# Patient Record
Sex: MALE | Race: BLACK OR AFRICAN AMERICAN | NOT HISPANIC OR LATINO | URBAN - METROPOLITAN AREA
[De-identification: names, ages, dates, MRNs, and addresses within clinical notes are randomized per-mention and may not be internally consistent; named-entity substitution may affect disease eponyms.]

---

## 2018-10-30 ENCOUNTER — HOSPITAL ENCOUNTER (INPATIENT)
Facility: HOSPITAL | Age: 68
LOS: 1 days | Discharge: LEFT BEFORE DISCHARGE | DRG: 282 | End: 2018-10-31
Attending: EMERGENCY MEDICINE | Admitting: HOSPITALIST

## 2018-10-30 DIAGNOSIS — I21.4 NSTEMI (NON-ST ELEVATED MYOCARDIAL INFARCTION): ICD-10-CM

## 2018-10-30 DIAGNOSIS — M79.89 LEG SWELLING: ICD-10-CM

## 2018-10-30 DIAGNOSIS — R07.9 CHEST PAIN: Primary | ICD-10-CM

## 2018-10-30 DIAGNOSIS — R45.5 HOSTILE BEHAVIOR: ICD-10-CM

## 2018-10-30 DIAGNOSIS — I21.4 NSTEMI (NON-ST ELEVATION MYOCARDIAL INFARCTION): ICD-10-CM

## 2018-10-30 DIAGNOSIS — I21.4 NON-ST ELEVATION MYOCARDIAL INFARCTION (NSTEMI): ICD-10-CM

## 2018-10-30 PROBLEM — F22 PARANOIA: Status: ACTIVE | Noted: 2018-10-30

## 2018-10-30 PROBLEM — E78.5 HLD (HYPERLIPIDEMIA): Status: ACTIVE | Noted: 2018-10-30

## 2018-10-30 PROBLEM — I10 HYPERTENSION: Status: ACTIVE | Noted: 2018-10-30

## 2018-10-30 PROBLEM — Z78.9 POOR HISTORIAN: Status: ACTIVE | Noted: 2018-10-30

## 2018-10-30 PROBLEM — R79.89 ELEVATED SERUM CREATININE: Status: ACTIVE | Noted: 2018-10-30

## 2018-10-30 PROBLEM — N17.9 AKI (ACUTE KIDNEY INJURY): Status: ACTIVE | Noted: 2018-10-30

## 2018-10-30 LAB
ABO + RH BLD: NORMAL
ALBUMIN SERPL BCP-MCNC: 4 G/DL
ALP SERPL-CCNC: 83 U/L
ALT SERPL W/O P-5'-P-CCNC: 21 U/L
ANION GAP SERPL CALC-SCNC: 11 MMOL/L
ANION GAP SERPL CALC-SCNC: 9 MMOL/L
APTT BLDCRRT: 25.3 SEC
APTT BLDCRRT: 41.3 SEC
AST SERPL-CCNC: 19 U/L
BASOPHILS # BLD AUTO: 0.06 K/UL
BASOPHILS NFR BLD: 0.6 %
BILIRUB SERPL-MCNC: 0.4 MG/DL
BLD GP AB SCN CELLS X3 SERPL QL: NORMAL
BNP SERPL-MCNC: 10 PG/ML
BUN SERPL-MCNC: 22 MG/DL
BUN SERPL-MCNC: 22 MG/DL
CALCIUM SERPL-MCNC: 10.1 MG/DL
CALCIUM SERPL-MCNC: 9.2 MG/DL
CHLORIDE SERPL-SCNC: 110 MMOL/L
CHLORIDE SERPL-SCNC: 111 MMOL/L
CHOLEST SERPL-MCNC: 194 MG/DL
CHOLEST/HDLC SERPL: 3.7 {RATIO}
CO2 SERPL-SCNC: 20 MMOL/L
CO2 SERPL-SCNC: 21 MMOL/L
CREAT SERPL-MCNC: 1.8 MG/DL
CREAT SERPL-MCNC: 2 MG/DL
DIFFERENTIAL METHOD: ABNORMAL
EOSINOPHIL # BLD AUTO: 0.1 K/UL
EOSINOPHIL NFR BLD: 0.5 %
ERYTHROCYTE [DISTWIDTH] IN BLOOD BY AUTOMATED COUNT: 14.7 %
EST. GFR  (AFRICAN AMERICAN): 38.5 ML/MIN/1.73 M^2
EST. GFR  (AFRICAN AMERICAN): 43.7 ML/MIN/1.73 M^2
EST. GFR  (NON AFRICAN AMERICAN): 33.3 ML/MIN/1.73 M^2
EST. GFR  (NON AFRICAN AMERICAN): 37.8 ML/MIN/1.73 M^2
ESTIMATED AVG GLUCOSE: 126 MG/DL
GLUCOSE SERPL-MCNC: 94 MG/DL
GLUCOSE SERPL-MCNC: 96 MG/DL
HBA1C MFR BLD HPLC: 6 %
HCT VFR BLD AUTO: 39.6 %
HDLC SERPL-MCNC: 53 MG/DL
HDLC SERPL: 27.3 %
HGB BLD-MCNC: 13 G/DL
IMM GRANULOCYTES # BLD AUTO: 0.05 K/UL
IMM GRANULOCYTES NFR BLD AUTO: 0.5 %
INR PPP: 1
LDLC SERPL CALC-MCNC: 131.8 MG/DL
LYMPHOCYTES # BLD AUTO: 2.4 K/UL
LYMPHOCYTES NFR BLD: 23 %
MAGNESIUM SERPL-MCNC: 2.2 MG/DL
MCH RBC QN AUTO: 29.5 PG
MCHC RBC AUTO-ENTMCNC: 32.8 G/DL
MCV RBC AUTO: 90 FL
MITRAL VALVE MOBILITY: NORMAL
MITRAL VALVE REGURGITATION: ABNORMAL
MONOCYTES # BLD AUTO: 0.8 K/UL
MONOCYTES NFR BLD: 7.2 %
NEUTROPHILS # BLD AUTO: 7.2 K/UL
NEUTROPHILS NFR BLD: 68.2 %
NONHDLC SERPL-MCNC: 141 MG/DL
NRBC BLD-RTO: 0 /100 WBC
PLATELET # BLD AUTO: 308 K/UL
PMV BLD AUTO: 10.4 FL
POTASSIUM SERPL-SCNC: 3.9 MMOL/L
POTASSIUM SERPL-SCNC: 4.3 MMOL/L
PROT SERPL-MCNC: 8.4 G/DL
PROTHROMBIN TIME: 10.7 SEC
RBC # BLD AUTO: 4.41 M/UL
RETIRED EF AND QEF - SEE NOTES: 40 (ref 55–65)
SODIUM SERPL-SCNC: 140 MMOL/L
SODIUM SERPL-SCNC: 142 MMOL/L
TRIGL SERPL-MCNC: 46 MG/DL
TROPONIN I SERPL DL<=0.01 NG/ML-MCNC: 0.05 NG/ML
TROPONIN I SERPL DL<=0.01 NG/ML-MCNC: 0.05 NG/ML
TROPONIN I SERPL DL<=0.01 NG/ML-MCNC: 0.06 NG/ML
TROPONIN I SERPL DL<=0.01 NG/ML-MCNC: 0.07 NG/ML
WBC # BLD AUTO: 10.56 K/UL

## 2018-10-30 PROCEDURE — 85025 COMPLETE CBC W/AUTO DIFF WBC: CPT

## 2018-10-30 PROCEDURE — 83036 HEMOGLOBIN GLYCOSYLATED A1C: CPT

## 2018-10-30 PROCEDURE — 25000003 PHARM REV CODE 250: Performed by: NURSE PRACTITIONER

## 2018-10-30 PROCEDURE — 25000003 PHARM REV CODE 250: Performed by: STUDENT IN AN ORGANIZED HEALTH CARE EDUCATION/TRAINING PROGRAM

## 2018-10-30 PROCEDURE — 85730 THROMBOPLASTIN TIME PARTIAL: CPT | Mod: 91

## 2018-10-30 PROCEDURE — 93306 TTE W/DOPPLER COMPLETE: CPT | Mod: 26,,, | Performed by: INTERNAL MEDICINE

## 2018-10-30 PROCEDURE — 96365 THER/PROPH/DIAG IV INF INIT: CPT

## 2018-10-30 PROCEDURE — 36415 COLL VENOUS BLD VENIPUNCTURE: CPT

## 2018-10-30 PROCEDURE — 63600175 PHARM REV CODE 636 W HCPCS: Performed by: HOSPITALIST

## 2018-10-30 PROCEDURE — 96375 TX/PRO/DX INJ NEW DRUG ADDON: CPT

## 2018-10-30 PROCEDURE — 99223 1ST HOSP IP/OBS HIGH 75: CPT | Mod: ,,, | Performed by: HOSPITALIST

## 2018-10-30 PROCEDURE — 93010 ELECTROCARDIOGRAM REPORT: CPT | Mod: 76,,, | Performed by: INTERNAL MEDICINE

## 2018-10-30 PROCEDURE — 96366 THER/PROPH/DIAG IV INF ADDON: CPT

## 2018-10-30 PROCEDURE — 84484 ASSAY OF TROPONIN QUANT: CPT | Mod: 91

## 2018-10-30 PROCEDURE — 93306 TTE W/DOPPLER COMPLETE: CPT

## 2018-10-30 PROCEDURE — 85610 PROTHROMBIN TIME: CPT

## 2018-10-30 PROCEDURE — 25000003 PHARM REV CODE 250: Performed by: EMERGENCY MEDICINE

## 2018-10-30 PROCEDURE — 63600175 PHARM REV CODE 636 W HCPCS: Performed by: STUDENT IN AN ORGANIZED HEALTH CARE EDUCATION/TRAINING PROGRAM

## 2018-10-30 PROCEDURE — 90791 PSYCH DIAGNOSTIC EVALUATION: CPT | Mod: ,,, | Performed by: NURSE PRACTITIONER

## 2018-10-30 PROCEDURE — 86901 BLOOD TYPING SEROLOGIC RH(D): CPT

## 2018-10-30 PROCEDURE — 93005 ELECTROCARDIOGRAM TRACING: CPT

## 2018-10-30 PROCEDURE — 99233 SBSQ HOSP IP/OBS HIGH 50: CPT | Mod: ,,, | Performed by: NURSE PRACTITIONER

## 2018-10-30 PROCEDURE — 99285 EMERGENCY DEPT VISIT HI MDM: CPT | Mod: 25

## 2018-10-30 PROCEDURE — 99285 EMERGENCY DEPT VISIT HI MDM: CPT | Mod: ,,, | Performed by: EMERGENCY MEDICINE

## 2018-10-30 PROCEDURE — 80048 BASIC METABOLIC PNL TOTAL CA: CPT

## 2018-10-30 PROCEDURE — 83880 ASSAY OF NATRIURETIC PEPTIDE: CPT

## 2018-10-30 PROCEDURE — 80053 COMPREHEN METABOLIC PANEL: CPT

## 2018-10-30 PROCEDURE — 25000003 PHARM REV CODE 250: Performed by: HOSPITALIST

## 2018-10-30 PROCEDURE — 85730 THROMBOPLASTIN TIME PARTIAL: CPT

## 2018-10-30 PROCEDURE — 80061 LIPID PANEL: CPT

## 2018-10-30 PROCEDURE — 11000001 HC ACUTE MED/SURG PRIVATE ROOM

## 2018-10-30 PROCEDURE — 83735 ASSAY OF MAGNESIUM: CPT

## 2018-10-30 PROCEDURE — 93010 ELECTROCARDIOGRAM REPORT: CPT | Mod: ,,, | Performed by: INTERNAL MEDICINE

## 2018-10-30 RX ORDER — HYDROMORPHONE HYDROCHLORIDE 1 MG/ML
1 INJECTION, SOLUTION INTRAMUSCULAR; INTRAVENOUS; SUBCUTANEOUS ONCE AS NEEDED
Status: COMPLETED | OUTPATIENT
Start: 2018-10-30 | End: 2018-10-30

## 2018-10-30 RX ORDER — OLANZAPINE 10 MG/2ML
5 INJECTION, POWDER, FOR SOLUTION INTRAMUSCULAR ONCE AS NEEDED
Status: DISPENSED | OUTPATIENT
Start: 2018-10-30 | End: 2018-10-30

## 2018-10-30 RX ORDER — LABETALOL HYDROCHLORIDE 5 MG/ML
20 INJECTION, SOLUTION INTRAVENOUS
Status: DISCONTINUED | OUTPATIENT
Start: 2018-10-30 | End: 2018-10-30

## 2018-10-30 RX ORDER — HEPARIN SODIUM,PORCINE/D5W 25000/250
12 INTRAVENOUS SOLUTION INTRAVENOUS CONTINUOUS
Status: DISCONTINUED | OUTPATIENT
Start: 2018-10-30 | End: 2018-10-30

## 2018-10-30 RX ORDER — CLONIDINE HYDROCHLORIDE 0.3 MG/1
0.3 TABLET ORAL
Status: COMPLETED | OUTPATIENT
Start: 2018-10-30 | End: 2018-10-30

## 2018-10-30 RX ORDER — ATORVASTATIN CALCIUM 20 MG/1
20 TABLET, FILM COATED ORAL DAILY
COMMUNITY

## 2018-10-30 RX ORDER — NAPROXEN SODIUM 220 MG/1
81 TABLET, FILM COATED ORAL DAILY
COMMUNITY

## 2018-10-30 RX ORDER — HEPARIN SODIUM,PORCINE/D5W 25000/250
30 INTRAVENOUS SOLUTION INTRAVENOUS
Status: DISCONTINUED | OUTPATIENT
Start: 2018-10-30 | End: 2018-10-30

## 2018-10-30 RX ORDER — DIAZEPAM 5 MG/1
5 TABLET ORAL EVERY 6 HOURS PRN
Status: DISCONTINUED | OUTPATIENT
Start: 2018-10-30 | End: 2018-10-31 | Stop reason: HOSPADM

## 2018-10-30 RX ORDER — CLOPIDOGREL BISULFATE 75 MG/1
75 TABLET ORAL DAILY
Status: DISCONTINUED | OUTPATIENT
Start: 2018-10-31 | End: 2018-10-31 | Stop reason: HOSPADM

## 2018-10-30 RX ORDER — ATORVASTATIN CALCIUM 20 MG/1
80 TABLET, FILM COATED ORAL DAILY
Status: DISCONTINUED | OUTPATIENT
Start: 2018-10-30 | End: 2018-10-31 | Stop reason: HOSPADM

## 2018-10-30 RX ORDER — NAPROXEN SODIUM 220 MG/1
81 TABLET, FILM COATED ORAL DAILY
Status: DISCONTINUED | OUTPATIENT
Start: 2018-10-31 | End: 2018-10-31 | Stop reason: HOSPADM

## 2018-10-30 RX ORDER — SYRING-NEEDL,DISP,INSUL,0.3 ML 29 G X1/2"
296 SYRINGE, EMPTY DISPOSABLE MISCELLANEOUS ONCE
COMMUNITY

## 2018-10-30 RX ORDER — TEMAZEPAM 22.5 MG/1
22.5 CAPSULE ORAL NIGHTLY PRN
COMMUNITY

## 2018-10-30 RX ORDER — NITROGLYCERIN 0.4 MG/1
0.4 TABLET SUBLINGUAL EVERY 5 MIN PRN
Status: DISCONTINUED | OUTPATIENT
Start: 2018-10-30 | End: 2018-10-30

## 2018-10-30 RX ORDER — CARVEDILOL 12.5 MG/1
12.5 TABLET ORAL 2 TIMES DAILY
Status: DISCONTINUED | OUTPATIENT
Start: 2018-10-30 | End: 2018-10-31 | Stop reason: HOSPADM

## 2018-10-30 RX ORDER — HALOPERIDOL 2 MG/1
2 TABLET ORAL EVERY 8 HOURS PRN
Status: DISCONTINUED | OUTPATIENT
Start: 2018-10-30 | End: 2018-10-30

## 2018-10-30 RX ORDER — OLANZAPINE 10 MG/1
10 TABLET ORAL DAILY
Status: DISCONTINUED | OUTPATIENT
Start: 2018-10-31 | End: 2018-10-31 | Stop reason: HOSPADM

## 2018-10-30 RX ORDER — MULTIVITAMIN
1 TABLET ORAL DAILY
COMMUNITY

## 2018-10-30 RX ORDER — CARVEDILOL 3.12 MG/1
3.12 TABLET ORAL 2 TIMES DAILY
Status: DISCONTINUED | OUTPATIENT
Start: 2018-10-30 | End: 2018-10-30

## 2018-10-30 RX ORDER — DIAZEPAM 5 MG/1
5 TABLET ORAL EVERY 6 HOURS PRN
Status: DISCONTINUED | OUTPATIENT
Start: 2018-10-30 | End: 2018-10-30

## 2018-10-30 RX ORDER — CLONIDINE 0.3 MG/24H
1 PATCH, EXTENDED RELEASE TRANSDERMAL
COMMUNITY

## 2018-10-30 RX ORDER — HEPARIN SODIUM,PORCINE/D5W 25000/250
18 INTRAVENOUS SOLUTION INTRAVENOUS CONTINUOUS
Status: DISCONTINUED | OUTPATIENT
Start: 2018-10-30 | End: 2018-10-30

## 2018-10-30 RX ORDER — HALOPERIDOL 5 MG/ML
5 INJECTION INTRAMUSCULAR EVERY 6 HOURS PRN
Status: DISCONTINUED | OUTPATIENT
Start: 2018-10-30 | End: 2018-10-30

## 2018-10-30 RX ORDER — METOPROLOL TARTRATE 1 MG/ML
10 INJECTION, SOLUTION INTRAVENOUS
Status: COMPLETED | OUTPATIENT
Start: 2018-10-30 | End: 2018-10-30

## 2018-10-30 RX ORDER — SODIUM CHLORIDE 9 MG/ML
INJECTION, SOLUTION INTRAVENOUS CONTINUOUS
Status: DISCONTINUED | OUTPATIENT
Start: 2018-10-30 | End: 2018-10-31 | Stop reason: HOSPADM

## 2018-10-30 RX ORDER — ASPIRIN 325 MG
325 TABLET ORAL
Status: COMPLETED | OUTPATIENT
Start: 2018-10-30 | End: 2018-10-30

## 2018-10-30 RX ORDER — METOPROLOL TARTRATE 50 MG/1
50 TABLET ORAL 2 TIMES DAILY
Status: DISCONTINUED | OUTPATIENT
Start: 2018-10-30 | End: 2018-10-30

## 2018-10-30 RX ORDER — ENOXAPARIN SODIUM 100 MG/ML
1 INJECTION SUBCUTANEOUS
Status: DISCONTINUED | OUTPATIENT
Start: 2018-10-30 | End: 2018-10-31 | Stop reason: HOSPADM

## 2018-10-30 RX ORDER — METOPROLOL TARTRATE 25 MG/1
25 TABLET, FILM COATED ORAL 2 TIMES DAILY
Status: DISCONTINUED | OUTPATIENT
Start: 2018-10-30 | End: 2018-10-30

## 2018-10-30 RX ADMIN — TICAGRELOR 180 MG: 90 TABLET ORAL at 07:10

## 2018-10-30 RX ADMIN — METOPROLOL TARTRATE 10 MG: 5 INJECTION, SOLUTION INTRAVENOUS at 05:10

## 2018-10-30 RX ADMIN — METOPROLOL TARTRATE 50 MG: 25 TABLET ORAL at 09:10

## 2018-10-30 RX ADMIN — ASPIRIN 325 MG ORAL TABLET 325 MG: 325 PILL ORAL at 03:10

## 2018-10-30 RX ADMIN — HEPARIN SODIUM AND DEXTROSE 18 UNITS/KG/HR: 10000; 5 INJECTION INTRAVENOUS at 05:10

## 2018-10-30 RX ADMIN — HEPARIN SODIUM AND DEXTROSE 12 UNITS/KG/HR: 10000; 5 INJECTION INTRAVENOUS at 07:10

## 2018-10-30 RX ADMIN — CLONIDINE HYDROCHLORIDE 0.3 MG: 0.3 TABLET ORAL at 04:10

## 2018-10-30 RX ADMIN — CARVEDILOL 12.5 MG: 12.5 TABLET, FILM COATED ORAL at 06:10

## 2018-10-30 RX ADMIN — ATORVASTATIN CALCIUM 80 MG: 20 TABLET, FILM COATED ORAL at 09:10

## 2018-10-30 RX ADMIN — HYDROMORPHONE HYDROCHLORIDE 1 MG: 1 INJECTION, SOLUTION INTRAMUSCULAR; INTRAVENOUS; SUBCUTANEOUS at 04:10

## 2018-10-30 RX ADMIN — DIAZEPAM 5 MG: 5 TABLET ORAL at 08:10

## 2018-10-30 RX ADMIN — SODIUM CHLORIDE 1000 ML: 0.9 INJECTION, SOLUTION INTRAVENOUS at 05:10

## 2018-10-30 NOTE — ASSESSMENT & PLAN NOTE
-Angina related to cocaine abuse although patient has history of CAD in the past  -Bedside echo did not show wall motion abnormalities  -This less likely to correspond to acute DVT or PE  -Would treat angina with BB as such as metoprolol and his BP as well.   -Would continue to trend troponins with EKG  -Obtain 2D echo  -Would hold of on angiogram for now until re-evaluation after proper medical therapy for angina and blood pressure  -Cardiology will follow along

## 2018-10-30 NOTE — SUBJECTIVE & OBJECTIVE
Past Medical History:   Diagnosis Date    DVT (deep venous thrombosis)     High cholesterol     Hypertension     MI, old     PE (pulmonary thromboembolism)     Stroke        History reviewed. No pertinent surgical history.    Review of patient's allergies indicates:   Allergen Reactions    Nitroglycerin Hives       No current facility-administered medications on file prior to encounter.      Current Outpatient Medications on File Prior to Encounter   Medication Sig    aspirin 81 MG Chew Take 81 mg by mouth once daily.    atorvastatin (LIPITOR) 20 MG tablet Take 20 mg by mouth once daily.    cloNIDine 0.3 mg/24 hr td ptwk (CATAPRES) 0.3 mg/24 hr Place 1 patch onto the skin every 7 days.    isometheptene-apap-dichloralphenazone 189-40-266bw (MIDRIN) -325 mg per capsule Take 1 capsule by mouth 4 (four) times daily as needed.    magnesium citrate (CITRATE OF MAGNESIA) solution Take 296 mLs by mouth once.    multivitamin (THERAGRAN) per tablet Take 1 tablet by mouth once daily.    temazepam (RESTORIL) 22.5 MG capsule Take 22.5 mg by mouth nightly as needed for Insomnia.     Family History     None        Tobacco Use    Smoking status: Light Tobacco Smoker     Types: Cigarettes    Smokeless tobacco: Never Used   Substance and Sexual Activity    Alcohol use: No     Frequency: Never    Drug use: No    Sexual activity: Not on file     Review of Systems   Constitutional: Positive for fatigue. Negative for activity change.   HENT: Negative for congestion and nosebleeds.    Eyes: Negative for photophobia and visual disturbance.   Respiratory: Negative for cough and shortness of breath.    Cardiovascular: Positive for chest pain and leg swelling. Negative for palpitations.   Gastrointestinal: Negative for abdominal pain, nausea and vomiting.   Endocrine: Negative for polydipsia and polyuria.   Genitourinary: Negative for dysuria and hematuria.   Musculoskeletal: Positive for arthralgias and gait problem.    Skin: Negative for color change and rash.   Neurological: Negative for dizziness, syncope and light-headedness.   Psychiatric/Behavioral: Positive for sleep disturbance.     Objective:     Vital Signs (Most Recent):  Temp: 97.4 °F (36.3 °C) (10/30/18 0349)  Pulse: 70 (10/30/18 0600)  Resp: 18 (10/30/18 0349)  BP: 127/61 (10/30/18 0559)  SpO2: 98 % (10/30/18 0600) Vital Signs (24h Range):  Temp:  [97.4 °F (36.3 °C)-98.3 °F (36.8 °C)] 97.4 °F (36.3 °C)  Pulse:  [70-99] 70  Resp:  [18] 18  SpO2:  [97 %-98 %] 98 %  BP: (127-174)/(61-97) 127/61     Weight: 106.6 kg (235 lb)  Body mass index is 32.78 kg/m².    Physical Exam   Constitutional: He is oriented to person, place, and time. He appears well-developed and well-nourished. No distress.   Frequently dozes off, sometimes mid-sentence while speaking.   HENT:   Head: Normocephalic and atraumatic.   Mouth/Throat: Oropharynx is clear and moist.   Eyes: Conjunctivae are normal. Pupils are equal, round, and reactive to light. No scleral icterus.   Neck: Normal range of motion. Neck supple. No JVD present.   Cardiovascular: Normal rate, regular rhythm, normal heart sounds and intact distal pulses. Exam reveals no gallop and no friction rub.   No murmur heard.  Pulmonary/Chest: Effort normal and breath sounds normal. He exhibits no tenderness.   Abdominal: Soft. Bowel sounds are normal. He exhibits no distension. There is no tenderness. There is no guarding.   Musculoskeletal: Normal range of motion. He exhibits edema (RLE mildly enlarged compared to LLE.  Sachin's positive). He exhibits no deformity.   Lymphadenopathy:     He has no cervical adenopathy.   Neurological: He is oriented to person, place, and time. No cranial nerve deficit.   Nods off frequently   Skin: Skin is warm and dry. No erythema. No pallor.   Nursing note and vitals reviewed.        CRANIAL NERVES     CN III, IV, VI   Pupils are equal, round, and reactive to light.       Significant Labs:   CBC:    Recent Labs   Lab 10/30/18  0310   WBC 10.56   HGB 13.0*   HCT 39.6*        CMP:   Recent Labs   Lab 10/30/18  0310      K 4.3   *   CO2 20*   GLU 96   BUN 22   CREATININE 2.0*   CALCIUM 10.1   PROT 8.4   ALBUMIN 4.0   BILITOT 0.4   ALKPHOS 83   AST 19   ALT 21   ANIONGAP 11   EGFRNONAA 33.3*     Troponin:   Recent Labs   Lab 10/30/18  0310   TROPONINI 0.047*  0.058*       Significant Imaging: CXR: I have reviewed all pertinent results/findings within the past 24 hours and my personal findings are:  No acute cardiopulmonary pathology

## 2018-10-30 NOTE — NURSING
Informed by transporter that pt wanted to know why he was going down to nuclear medicine. Arrived at bedside to explain to pt why he needed to go down, interrupted by pt who stated he was not going down and angrily yelling to get out of his room. Notified Christine Ayon NP. NP to come up to speak to pt.

## 2018-10-30 NOTE — NURSING
" Patient on phone for 20  Minutes asking for who was incharge stating that "this floor is incompetent and the Charge nurse is incompetent. Patient refuses to answer questions regarding admissions.Patient refuses vital signs as well.  Patient informed this writer that all I needed to do is get him some water and leave. Got patient his water and informed the charge nurse.  "

## 2018-10-30 NOTE — ED TRIAGE NOTES
"Pt reports left sided chest pain. Pt states it feels like an "ice pick is picking me" in the chest. Pt reports SOB with pain. Pt states he has hx of MI and it feels the same as in the past.   "

## 2018-10-30 NOTE — NURSING
Cardiology at bedside, Dr. Treadwell aware of intermittent CP and EKG taken. Provider currently on phone with attending service Med Team QI

## 2018-10-30 NOTE — ASSESSMENT & PLAN NOTE
Assessment:   Mr. Ramirez is a 69 y/o male who presented to [facility] [place] for with chest pain.Psychiatry was consulted for hostile behavior and patient request to be put on his psychiatric home medications. During interview patient is upset but cooperative. He reports he want to remain in the hospital and receive treatment, however, he appears to have an unrealistic expectation of what a hospital stay involves.     Recommendations:  1. Dispo/Legal: None at this time as patient is not a danger to himself or others.   2. Scheduled Medications: Restoril 15 mg by mouth at bedtime. May increase to 30 mg tomorrow is patient tolerates first dose.  3. PRN Medications: Zyprexa 5 mg PO or IM prn non-redirectable agitation if needed to help the pt more effectively interact with environment.   4. Will continue to follow.

## 2018-10-30 NOTE — H&P
"[facility] Hospital Medicine  History & Physical    Patient Name: Denis Ramirez  MRN: [id number]  Admission Date: 10/30/2018  Attending Physician: Geronimo Puente MD   Primary Care Provider: No primary care provider on file.    Hospital Medicine Team: Networked reference to record PCT  Vahid Tanner MD     Patient information was obtained from patient and ER records.     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  After the show he got up to come here and noted worsening with activity.  It is mid-chest "like an ice pick" but not associated with diaphoresis, nausea, dyspnea, or pleurisy.  He notes that the sensation is identical to the last time he had a blood clot go to his lungs.  He admits that he hasn't been taking care of himself like he should be and is only partially compliant with his medications as he is often on the road.  The pain has remained persistent and refuses nitroglycerin due to prior exposures causing hives.  The pain improved following hydromorphone administration but has not resolved, and repeatedly dozes off during interview.  He also endorses increased RLE edema and pain, which is the leg in which he previously had had a DVT.  He also reports that he has bad arthritis in his knee and lately has fallen multiple times.    Past Medical History:   Diagnosis Date    DVT (deep venous thrombosis)     High cholesterol     Hypertension     MI, old     PE (pulmonary thromboembolism)     Stroke        History reviewed. No pertinent surgical history.    Review of patient's allergies indicates:   Allergen Reactions    Nitroglycerin Hives       No current facility-administered medications on " file prior to encounter.      Current Outpatient Medications on File Prior to Encounter   Medication Sig    aspirin 81 MG Chew Take 81 mg by mouth once daily.    atorvastatin (LIPITOR) 20 MG tablet Take 20 mg by mouth once daily.    cloNIDine 0.3 mg/24 hr td ptwk (CATAPRES) 0.3 mg/24 hr Place 1 patch onto the skin every 7 days.    isometheptene-apap-dichloralphenazone 594-12-544qd (MIDRIN) -325 mg per capsule Take 1 capsule by mouth 4 (four) times daily as needed.    magnesium citrate (CITRATE OF MAGNESIA) solution Take 296 mLs by mouth once.    multivitamin (THERAGRAN) per tablet Take 1 tablet by mouth once daily.    temazepam (RESTORIL) 22.5 MG capsule Take 22.5 mg by mouth nightly as needed for Insomnia.     Family History     None        Tobacco Use    Smoking status: Light Tobacco Smoker     Types: Cigarettes    Smokeless tobacco: Never Used   Substance and Sexual Activity    Alcohol use: No     Frequency: Never    Drug use: No    Sexual activity: Not on file     Review of Systems   Constitutional: Positive for fatigue. Negative for activity change.   HENT: Negative for congestion and nosebleeds.    Eyes: Negative for photophobia and visual disturbance.   Respiratory: Negative for cough and shortness of breath.    Cardiovascular: Positive for chest pain and leg swelling. Negative for palpitations.   Gastrointestinal: Negative for abdominal pain, nausea and vomiting.   Endocrine: Negative for polydipsia and polyuria.   Genitourinary: Negative for dysuria and hematuria.   Musculoskeletal: Positive for arthralgias and gait problem.   Skin: Negative for color change and rash.   Neurological: Negative for dizziness, syncope and light-headedness.   Psychiatric/Behavioral: Positive for sleep disturbance.     Objective:     Vital Signs (Most Recent):  Temp: 97.4 °F (36.3 °C) (10/30/18 0349)  Pulse: 70 (10/30/18 0600)  Resp: 18 (10/30/18 0349)  BP: 127/61 (10/30/18 0559)  SpO2: 98 % (10/30/18 0600)  Vital Signs (24h Range):  Temp:  [97.4 °F (36.3 °C)-98.3 °F (36.8 °C)] 97.4 °F (36.3 °C)  Pulse:  [70-99] 70  Resp:  [18] 18  SpO2:  [97 %-98 %] 98 %  BP: (127-174)/(61-97) 127/61     Weight: 106.6 kg (235 lb)  Body mass index is 32.78 kg/m².    Physical Exam   Constitutional: He is oriented to person, place, and time. He appears well-developed and well-nourished. No distress.   Frequently dozes off, sometimes mid-sentence while speaking.   HENT:   Head: Normocephalic and atraumatic.   Mouth/Throat: Oropharynx is clear and moist.   Eyes: Conjunctivae are normal. Pupils are equal, round, and reactive to light. No scleral icterus.   Neck: Normal range of motion. Neck supple. No JVD present.   Cardiovascular: Normal rate, regular rhythm, normal heart sounds and intact distal pulses. Exam reveals no gallop and no friction rub.   No murmur heard.  Pulmonary/Chest: Effort normal and breath sounds normal. He exhibits no tenderness.   Abdominal: Soft. Bowel sounds are normal. He exhibits no distension. There is no tenderness. There is no guarding.   Musculoskeletal: Normal range of motion. He exhibits edema (RLE mildly enlarged compared to LLE.  Sachin's positive). He exhibits no deformity.   Lymphadenopathy:     He has no cervical adenopathy.   Neurological: He is oriented to person, place, and time. No cranial nerve deficit.   Nods off frequently   Skin: Skin is warm and dry. No erythema. No pallor.   Nursing note and vitals reviewed.        CRANIAL NERVES     CN III, IV, VI   Pupils are equal, round, and reactive to light.       Significant Labs:   CBC:   Recent Labs   Lab 10/30/18  0310   WBC 10.56   HGB 13.0*   HCT 39.6*        CMP:   Recent Labs   Lab 10/30/18  0310      K 4.3   *   CO2 20*   GLU 96   BUN 22   CREATININE 2.0*   CALCIUM 10.1   PROT 8.4   ALBUMIN 4.0   BILITOT 0.4   ALKPHOS 83   AST 19   ALT 21   ANIONGAP 11   EGFRNONAA 33.3*     Troponin:   Recent Labs   Lab 10/30/18  0310   TROPONINI  0.047*  0.058*       Significant Imaging: CXR: I have reviewed all pertinent results/findings within the past 24 hours and my personal findings are:  No acute cardiopulmonary pathology    Assessment/Plan:     * NSTEMI (non-ST elevated myocardial infarction)    Mildly elevated troponin with ongoing chest pain.  Seems exertional but unable to use nitroglycerin to probe for relief.  Discussed with Cardiology fellow; if 2nd troponin (pending) increases with ongoing chest pain contact Interventional Cardiology to activate cath lab.  US LE pending to evaluate for DVT; will get V/Q scan to evaluate for PE.  NSTEMI pathway initiated with low-intensity heparin infusion protocol in event LHC warranted.         Elevated serum creatinine    Unknown baseline, but with low BUN likely degree of chronic renal insufficiency.  He reports being told that he has kidney problems.  No CTA to evaluate for PE.  Caution with contrast if patient requires LHC.         VTE Risk Mitigation (From admission, onward)        Ordered     heparin 25,000 units in dextrose 5% (100 units/ml) IV bolus from bag  Once      10/30/18 0633     heparin 25,000 units in dextrose 5% 250 mL (100 units/mL) infusion LOW INTENSITY nomogram - [facility]  Continuous      10/30/18 0633     heparin 25,000 units in dextrose 5% (100 units/ml) IV bolus from bag - ADDITIONAL PRN BOLUS - 60 units/kg (max bolus 4000 units)  As needed (PRN)      10/30/18 0633     heparin 25,000 units in dextrose 5% 250 mL (100 units/mL) infusion LOW INTENSITY nomogram - [facility]  As needed (PRN)      10/30/18 0633     Reason for No Pharmacological VTE Prophylaxis  Once      10/30/18 0633     IP VTE HIGH RISK PATIENT  Once      10/30/18 0633     Reason for no Mechanical VTE Prophylaxis  Once      10/30/18 0459             Vahid Tanner MD  Department of Hospital Medicine   [facility]

## 2018-10-30 NOTE — CONSULTS
"[facility]  Psychiatry  Consult Note    Patient Name: Denis Ramirez  MRN: [id number]   Code Status: Full Code  Admission Date: 10/30/2018  Hospital Length of Stay: 0 days  Attending Physician: Geronimo Puente MD  Primary Care Provider: Primary Doctor No    Current Legal Status: N/A    Patient information was obtained from patient and ER records.   Inpatient consult to Psychiatry  Consult performed by: Modesta Arredondo DNP  Consult ordered by: Christine Ayon NP  Reason for consult: hostile behavior, requesting pysch consult review medications.        Subjective:     Principal Problem:NSTEMI (non-ST elevated myocardial infarction)    Chief Complaint:  hostile behavior, requesting pysch consult review medications     HPI: Per ED MD:  "68 y.o. M p/w chest pain. Hx of MI in the past x2, also DVT and PE diagnosed a few months ago. 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 +shortness of breath, no nausea or diaphoresis. He says it feels the exact same as when he had an MI a few years ago. Endorses "un poquito" cocaine use, however denies using it tonight. 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  After the show he got up to come here and noted worsening with activity.  It is mid-chest "like an ice pick" but not associated with diaphoresis, nausea, dyspnea, or pleurisy.  He notes that the sensation is identical to the last " "time he had a blood clot go to his lungs.  He admits that he hasn't been taking care of himself like he should be and is only partially compliant with his medications as he is often on the road.  The pain has remained persistent and refuses nitroglycerin due to prior exposures causing hives.  The pain improved following hydromorphone administration but has not resolved, and repeatedly dozes off during interview.  He also endorses increased RLE edema and pain, which is the leg in which he previously had had a DVT.  He also reports that he has bad arthritis in his knee and lately has fallen multiple times."    On my interview:  Patient is tangential and perseverating on the events of early today. He reports he came in for his heart; "they ran 12 testes last night which I was NPO for." He reports already being "npo" for several hours prior to this hospitalization as a result of his job as a . He reports after "16 hours" of being NPO he had enough and needed to eat. He became upset when the doctor confronted him about refusing tests without asking him why he was refusing them. He feel he was "hurt my injustice" and staff here "dont respect the sovereignty and prestige of this establishment." he admits to "calling them name because they were being unneccessary snitches." He went on to expressed issues with security, nursing staff, and the "practitioners" on his care team. He reports he want to remain in the hospital and receive treatment, however, he appears to have an unrealistic expectation of what a hospital stay involves.      Regarding psychiatric history patient was evasive in his details. He reports he takes Restoril 30 mg and Ativan 5 mg at bedtime. He reports he is unable to take "any psychotropics or antidepressants because they enhance my negativity." Previous medications include Mellaril, Wellbutrin. Zoloft, Prolixin, Tegretol, Risperdal, Haldol, and Thorazine. He reports his psychiatric diagnoses " "are sleep disturbances and dementia. Denies previous hospitalization, past or present SI/HI/AV/VH, delusions, or paranoia. He denies history depression. Regarding manic symptoms he reports history of going 2 weeks without sleep (denies drug use during that time); denies FOI, racing thoughts, impulsive/risk taking behaviors. He uses cocaine during holidays ("christmas and new years") only.    Interview terminated prematurely because patient taken to nuclear med test.     Hospital Course: No notes on file         Patient History           Medical as of 10/30/2018     Past Medical History     Diagnosis Date Comments Source    DVT (deep venous thrombosis) -- -- Provider    High cholesterol -- -- Provider    Hypertension -- -- Provider    MI, old -- -- Provider    PE (pulmonary thromboembolism) -- -- Provider    Stroke -- -- Provider          Pertinent Negatives     Diagnosis Date Noted Comments Source    Diabetes mellitus 10/30/2018 -- Provider                  Surgical as of 10/30/2018    Past Surgical History: Patient provided no pertinent surgical history.           Family as of 10/30/2018    None           Tobacco Use as of 10/30/2018     Smoking Status Smoking Start Date Smoking Quit Date Packs/Day Years Used    Light Tobacco Smoker -- -- -- --    Types Comments Smokeless Tobacco Status Smokeless Tobacco Quit Date Source     Cigarettes -- Never Used -- Provider            Alcohol Use as of 10/30/2018     Alcohol Use Drinks/Week Alcohol/Week Comments Source    No -- -- -- Provider    Frequency Standard Drinks Binge Drinking Source      Never -- -- Provider             Drug Use as of 10/30/2018     Drug Use Types Frequency Comments Source    No -- -- -- Provider            Sexual Activity as of 10/30/2018     Sexually Active Birth Control Partners Comments Source    -- -- -- -- Provider            Activities of Daily Living as of 10/30/2018    None           Social Documentation as of 10/30/2018    None         "   Occupational as of 10/30/2018    None           Socioeconomic as of 10/30/2018     Marital Status Spouse Name Number of Children Years Education Education Level Preferred Language Ethnicity Race Source    Unknown -- -- -- -- English /Black Black or  --    Financial Resource Strain Food Insecurity: Worry Food Insecurity: Inability Transportation Needs: Medical Transportation Needs: Non-medical       -- -- -- -- --             Pertinent History     Question Response Comments    Lives with -- --    Place in Birth Order -- --    Lives in -- --    Number of Siblings -- --    Raised by -- --    Legal Involvement -- --    Childhood Trauma -- --    Criminal History of -- --    Financial Status -- --    Highest Level of Education -- --    Does patient have access to a firearm? -- --     Service -- --    Primary Leisure Activity -- --    Spirituality -- --        Past Medical History:   Diagnosis Date    DVT (deep venous thrombosis)     High cholesterol     Hypertension     MI, old     PE (pulmonary thromboembolism)     Stroke      History reviewed. No pertinent surgical history.  Family History     None        Tobacco Use    Smoking status: Light Tobacco Smoker     Types: Cigarettes    Smokeless tobacco: Never Used   Substance and Sexual Activity    Alcohol use: No     Frequency: Never    Drug use: No    Sexual activity: Not on file     Review of patient's allergies indicates:   Allergen Reactions    Nitroglycerin Hives       No current facility-administered medications on file prior to encounter.      Current Outpatient Medications on File Prior to Encounter   Medication Sig    aspirin 81 MG Chew Take 81 mg by mouth once daily.    atorvastatin (LIPITOR) 20 MG tablet Take 20 mg by mouth once daily.    cloNIDine 0.3 mg/24 hr td ptwk (CATAPRES) 0.3 mg/24 hr Place 1 patch onto the skin every 7 days.    isometheptene-apap-dichloralphenazone [id number] (MIDRIN)  "-325 mg per capsule Take 1 capsule by mouth 4 (four) times daily as needed.    magnesium citrate (CITRATE OF MAGNESIA) solution Take 296 mLs by mouth once.    multivitamin (THERAGRAN) per tablet Take 1 tablet by mouth once daily.    temazepam (RESTORIL) 22.5 MG capsule Take 22.5 mg by mouth nightly as needed for Insomnia.     Psychotherapeutics (From admission, onward)    Start     Stop Route Frequency Ordered    10/30/18 1745  diazePAM tablet 5 mg      -- Oral Every 6 hours PRN 10/30/18 1645    10/30/18 1443  haloperidol tablet 2 mg      -- Oral Every 8 hours PRN 10/30/18 1443    10/30/18 1443  haloperidol lactate injection 5 mg      -- IV Every 6 hours PRN 10/30/18 1443        Review of Systems   Constitutional: Negative.    HENT: Negative.    Eyes: Negative.    Endocrine: Negative.    Allergic/Immunologic: Negative.    Psychiatric/Behavioral: Positive for agitation, behavioral problems and sleep disturbance. Negative for decreased concentration, dysphoric mood, hallucinations, self-injury and suicidal ideas.     Strengths and Liabilities: Strength: Patient is expressive/articulate., Strength: Patient is intelligent., Liability: Patient lacks social skills., Liability: Patient has no suport network., Liability: Patient lacks coping skills.    Objective:     Vital Signs (Most Recent):  Temp: 97.8 °F (36.6 °C) (10/30/18 0822)  Pulse: 70 (10/30/18 0600)  Resp: 19 (10/30/18 0822)  BP: 127/61 (10/30/18 0559)  SpO2: 98 % (10/30/18 0600) Vital Signs (24h Range):  Temp:  [97.4 °F (36.3 °C)-98.3 °F (36.8 °C)] 97.8 °F (36.6 °C)  Pulse:  [70-99] 70  Resp:  [18-19] 19  SpO2:  [97 %-98 %] 98 %  BP: (127-174)/(61-97) 127/61     Height: 5' 11" (180.3 cm)  Weight: 106.6 kg (235 lb)  Body mass index is 32.78 kg/m².    No intake or output data in the 24 hours ending 10/30/18 1651    Physical Exam   Constitutional: He is oriented to person, place, and time.   Neurological: He is oriented to person, place, and time. Gait " normal.   Psychiatric: His behavior is normal. Cognition and memory are normal.   Psychiatric Review Of Systems - Is patient experiencing or having changes in:  sleep: yes, without restoril  appetite: no  weight: no  energy/anergy: no  interest/pleasure/anhedonia: no  somatic symptoms: no  libido: no  anxiety/panic: yes, without ativan  guilty/hopelessness: no  concentration: no  S.I.B.s/risky behavior: no  Irritability: yes, without ativan  Racing thoughts: no  Impulsive behaviors: no  Paranoia:no  AVH:no     NEUROLOGICAL EXAMINATION:     MENTAL STATUS   Oriented to person, place, and time.   Level of consciousness: alert       Appearance: unremarkable, age appropriate, well nourished, lying in bed, dressed in paper scrubs  Behavior/Copperation: cooperative  Speech: normal tone, normal rate, normal pitch, normal volume, spontaneous  Mood:  upset  Affect: mood-congruent  Thought Process: tangential, perseverative  Thought Content: normal, no suicidality, no homicidality, delusions, or paranoia  Orientation:  grossly intact  Memory: Grossly intact  Attention/Concentration:  Normal  Cognition: grossly intact  Insight: fair  Judgement: fair  Reliability: questionable       GAIT AND COORDINATION     Gait  Gait: normal    Significant Labs: Urine Drug screen pending collection    Significant Imaging: Chest x-ray unremarkable    Assessment/Plan:     Hostile behavior    Assessment:   Mr. Ramirez is a 67 y/o male who presented to [facility] for with chest pain.Psychiatry was consulted for hostile behavior and patient request to be put on his psychiatric home medications. During interview patient is upset but cooperative. He reports he want to remain in the hospital and receive treatment, however, he appears to have an unrealistic expectation of what a hospital stay involves.     Recommendations:  1. Dispo/Legal: None at this time as patient is not a danger to himself or others.   2. Scheduled Medications: Restoril 15 mg by mouth at  bedtime. May increase to 30 mg tomorrow is patient tolerates first dose.  3. PRN Medications: Zyprexa 5 mg PO or IM prn non-redirectable agitation if needed to help the pt more effectively interact with environment.   4. Will continue to follow.               Total Time:  45 minutes     Modesta Arredondo DNP   Psychiatry  [facility]

## 2018-10-30 NOTE — ED NOTES
Pt resting comfortably and independently repositioned in stretcher with bed locked in lowest position for safety. NAD noted at this time. Respirations even and unlabored and visible chest rise noted.  Patient offered bathroom assistance and denies need at this time. Pt instructed to call if assistance is needed. Pt on continuous cardiac, BP, and O2 monitoring. Call light within reach. No needs at this time. Will continue to monitor.

## 2018-10-30 NOTE — NURSING
Patient on tele box with [facility]. Attempted twice to get labs for troponin and ptt for heparin gtt due at 1130. Patient refused twice. RN called Christine Ayon who said to leave gtt running. Floor RN aware.

## 2018-10-30 NOTE — ED PROVIDER NOTES
"Encounter Date: 10/30/2018    SCRIBE #1 NOTE: I, Gaby Paige, am scribing for, and in the presence of,  Dr. Lopez . I have scribed the following portions of the note - the Resident attestation.       History     Chief Complaint   Patient presents with    Chest Pain     pt reports waking up with CP x 4 hours     HPI   68 y.o. M p/w chest pain. Hx of MI in the past x2, also DVT and PE diagnosed a few months ago.   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 +shortness of breath, no nausea or diaphoresis.  He says it feels the exact same as when he had an MI a few years ago.   Endorses "un poquito" cocaine use, however denies using it tonight.   He has been having RLE swelling for months, which is how he was diagnosed with DVT and PE, in [place].      Review of patient's allergies indicates:   Allergen Reactions    Nitroglycerin Hives     Past Medical History:   Diagnosis Date    DVT (deep venous thrombosis)     High cholesterol     Hypertension     MI, old     Stroke      History reviewed. No pertinent surgical history.  History reviewed. No pertinent family history.  Social History     Tobacco Use    Smoking status: Light Tobacco Smoker     Types: Cigarettes    Smokeless tobacco: Never Used   Substance Use Topics    Alcohol use: No     Frequency: Never    Drug use: No     Review of Systems   Constitutional: Negative for chills, diaphoresis and fever.   HENT: Negative for rhinorrhea and sore throat.    Eyes: Negative for pain and visual disturbance.   Respiratory: Positive for shortness of breath. Negative for cough and chest " tightness.    Cardiovascular: Positive for chest pain and leg swelling. Negative for palpitations.   Gastrointestinal: Negative for abdominal pain, blood in stool, constipation, nausea and vomiting.   Genitourinary: Negative for dysuria, frequency and hematuria.   Musculoskeletal: Negative for back pain and neck stiffness.   Skin: Negative for rash and wound.   Neurological: Negative for seizures, syncope, weakness, light-headedness and headaches.   All other systems reviewed and are negative.      Physical Exam     Initial Vitals [10/30/18 0247]   BP Pulse Resp Temp SpO2   (!) 159/86 99 18 98.3 °F (36.8 °C) 97 %      MAP       --         Physical Exam    Nursing note and vitals reviewed.  Constitutional: He appears well-developed. He is not diaphoretic. No distress.   HENT:   Head: Normocephalic and atraumatic.   Nose: Nose normal.   Mouth/Throat: Oropharynx is clear and moist.   Eyes: Conjunctivae and EOM are normal. Pupils are equal, round, and reactive to light.   Neck: Normal range of motion. Neck supple.   Cardiovascular: Normal heart sounds and intact distal pulses.   No murmur heard.  Pulses:       Dorsalis pedis pulses are 2+ on the right side, and 2+ on the left side.   Pulmonary/Chest: Breath sounds normal. No respiratory distress. He exhibits no tenderness.   Abdominal: Soft. Normal appearance and bowel sounds are normal. He exhibits no distension. There is no tenderness.   Neurological: He is alert and oriented to person, place, and time. He has normal strength. He exhibits normal muscle tone.   Skin: Skin is warm and dry. No pallor.         ED Course   Procedures  Labs Reviewed   CBC W/ AUTO DIFFERENTIAL - Abnormal; Notable for the following components:       Result Value    RBC 4.41 (*)     Hemoglobin 13.0 (*)     Hematocrit 39.6 (*)     RDW 14.7 (*)     Immature Grans (Abs) 0.05 (*)     All other components within normal limits   COMPREHENSIVE METABOLIC PANEL - Abnormal; Notable for the following  "components:    Chloride 111 (*)     CO2 20 (*)     Creatinine 2.0 (*)     eGFR if  38.5 (*)     eGFR if non  33.3 (*)     All other components within normal limits   TROPONIN I - Abnormal; Notable for the following components:    Troponin I 0.058 (*)     All other components within normal limits   TROPONIN I - Abnormal; Notable for the following components:    Troponin I 0.047 (*)     All other components within normal limits   B-TYPE NATRIURETIC PEPTIDE   PROTIME-INR   APTT   APTT   PROTIME-INR   TOXICOLOGY SCREEN, URINE, RANDOM (COMPLIANCE)       HO-III MDM  68 y.o.male presents with chest pain. Hx of MI and PE. Phys exam was notable for RLE swelling, VSS, calm gentleman.   DDX includes: MI, PE, DVT, cocaine coronary artery vasospasms, PNA, PTX considered however less likely given stable vitals, clear lungs BL.     Work up and treatment include cardiac w/u, PE study, DVT u/s.  EKG reviewed:  Rhythm is sinus  Rate is  normal  GA interval normal  QRS Axis is left-sided.  QTc interval normal  No HALI or STD; TWI in aVL.   Will repeat EKG.     Pt is aware of plan and is amenable.     "CJ" Kathryn Alaniz M.D.  [facility] EMERGENCY MEDICINE PGY-2  2:54 AM 10/30/2018    HO-III UPDATE  Pt resting in bed.   Pain no 8/10 from 10/10 with dilaudid.   Per pt, he has hives/urticaria with nitroglycerin and has refused to take it.   Clonidine ordered for his BP.   Cr 2.0, Trop 0.058. Cardiology called to evaluate patient given active CP, known hx of MI and likely has PE.   Will hold off on CT PE contrasted study, at this time.  DVT u/s still ordered.  Heparin started.     Cardiology evaluated and will not cath at this time, despite active chest pain.   They ordered metoprolol.  They requested ativan. Will not order at this time.  UDS placed, and expect +cocaine metabolites, however patient said he hadn't used in a day, so I doubt this is cocaine-chest pain, also vitals stable.     Pt is aware of plan and is " "amenable. Case d/w Dr. Lopez.    "CJ" Kathryn Alaniz M.D.  [facility] EMERGENCY MEDICINE PGY-2  4:40 AM 10/30/2018        Imaging Results    None          Medical Decision Making:   History:   Old Medical Records: I decided to obtain old medical records.  Clinical Tests:   Lab Tests: Ordered and Reviewed  Radiological Study: Ordered and Reviewed  Medical Tests: Ordered and Reviewed            Scribe Attestation:   Scribe #1: I performed the above scribed service and the documentation accurately describes the services I performed. I attest to the accuracy of the note.    Attending Attestation:   Physician Attestation Statement for Resident:  As the supervising MD   Physician Attestation Statement: I have personally seen and examined this patient.   I agree with the above history. -:   As the supervising MD I agree with the above PE.    As the supervising MD I agree with the above treatment, course, plan, and disposition.  I have reviewed and agree with the residents interpretation of the following: EKG, lab data and CT scans.            Attending ED Notes:   High risk chest pain, also risk factors for DVT/PE. Trop elevated. Cr too high for CTA. Will start heparin ggt, admit for serial troponins.              Clinical Impression:   The primary encounter diagnosis was Chest pain. Diagnoses of Leg swelling and NSTEMI (non-ST elevated myocardial infarction) were also pertinent to this visit.      Disposition:   Disposition: Admitted  Condition: Fair                        Kathryn Alaniz MD  Resident  10/30/18 0442       Kathryn Alaniz MD  Resident  10/30/18 0458       Fuentes Lopez MD  10/30/18 0515    "

## 2018-10-30 NOTE — NURSING
Patient agitation. Patient claims cardiology said he was discharged. Wants to leave without further tests or labs. Attending called and asked to come to bedside to speak to patient. Charge nurse at bedside now.

## 2018-10-30 NOTE — PROGRESS NOTES
[facility]  Cardiology  Consult Note    Patient Name: Denis Ramirez  MRN: [id number]  Admission Date: 10/30/2018  Hospital Length of Stay: 0 days  Code Status: Full Code   Attending Physician: Fuentes Lopez MD   Primary Care Physician: No primary care provider on file.  Expected Discharge Date:   Principal Problem:<principal problem not specified>    Subjective:     Hospital Course:   No notes on file    Interval History:     Mr Ramirez is a 68 y.o. M  with medical history significant for HTN, chronic tobacco abuse, cocaine abuse with last use one day ago, history of DVT on pradaxa but not currently taking who came in with complaints of chest pain over the past couple of hours. Patient is a  and was practicing last night when he suddenly start to develop pressure like chest pain.Pain was 8/10 in intensity located in the left side of his chest. Denies irradiation. Pain was similar to his last heart attack episode which was 13   years ago.   Upon ED presentation, he was presenting with angina, could not take nitro because of allergic reaction (hives).He was given clonidine which is his home medicine for BP. Metoprolol IV 10 mg was given for relief of his angina. Cr was 2.0. EKG showed SR with :VH and repolarization abnormalities+ LAD. Cardiology was consulted for further work up.       Review of Systems   Constitution: Negative.   HENT: Negative.    Eyes: Negative.    Cardiovascular: Positive for chest pain and dyspnea on exertion. Negative for claudication, cyanosis, irregular heartbeat, leg swelling, near-syncope, orthopnea, palpitations, paroxysmal nocturnal dyspnea and syncope.   Respiratory: Positive for shortness of breath. Negative for cough, hemoptysis, sleep disturbances due to breathing, snoring, sputum production and wheezing.    Endocrine: Negative for cold intolerance, heat intolerance, polydipsia and polyphagia.   Hematologic/Lymphatic: Negative.    Gastrointestinal:  Negative.    Genitourinary: Negative.      Objective:     Vital Signs (Most Recent):  Temp: 97.4 °F (36.3 °C) (10/30/18 0349)  Pulse: 84 (10/30/18 0349)  Resp: 18 (10/30/18 0349)  BP: (!) 174/97 (10/30/18 0349)  SpO2: 98 % (10/30/18 0349) Vital Signs (24h Range):  Temp:  [97.4 °F (36.3 °C)-98.3 °F (36.8 °C)] 97.4 °F (36.3 °C)  Pulse:  [84-99] 84  Resp:  [18] 18  SpO2:  [97 %-98 %] 98 %  BP: (159-174)/(86-97) 174/97     Weight: 106.6 kg (235 lb)  Body mass index is 32.78 kg/m².     SpO2: 98 %  O2 Device (Oxygen Therapy): room air    No intake or output data in the 24 hours ending 10/30/18 0444    Lines/Drains/Airways     Peripheral Intravenous Line                 Peripheral IV - Single Lumen 10/30/18 0310 Right Hand less than 1 day                Physical Exam   Constitutional: He is oriented to person, place, and time. He appears well-developed and well-nourished.   HENT:   Head: Normocephalic and atraumatic.   Neck: Neck supple. No JVD present. No thyromegaly present.   Cardiovascular: Normal rate, regular rhythm, normal heart sounds and intact distal pulses.   Pulmonary/Chest: Breath sounds normal. No respiratory distress. He has no wheezes. He has no rales.   Abdominal: Soft. Bowel sounds are normal. He exhibits no distension. There is no tenderness. There is no rebound.   Musculoskeletal: Normal range of motion.   Neurological: He is oriented to person, place, and time.   Skin: Skin is warm.   Nursing note and vitals reviewed.      Significant Labs:   BMP:   Recent Labs   Lab 10/30/18  0310   GLU 96      K 4.3   *   CO2 20*   BUN 22   CREATININE 2.0*   CALCIUM 10.1   , CMP   Recent Labs   Lab 10/30/18  0310      K 4.3   *   CO2 20*   GLU 96   BUN 22   CREATININE 2.0*   CALCIUM 10.1   PROT 8.4   ALBUMIN 4.0   BILITOT 0.4   ALKPHOS 83   AST 19   ALT 21   ANIONGAP 11   ESTGFRAFRICA 38.5*   EGFRNONAA 33.3*   , CBC   Recent Labs   Lab 10/30/18  0310   WBC 10.56   HGB 13.0*   HCT 39.6*   PLT  308   , Lipid Panel No results for input(s): CHOL, HDL, LDLCALC, TRIG, CHOLHDL in the last 48 hours., Troponin   Recent Labs   Lab 10/30/18  0310   TROPONINI 0.047*  0.058*    and All pertinent lab results from the last 24 hours have been reviewed.    Significant Imaging: Echocardiogram: 2D echo with color flow doppler: No results found for this or any previous visit.    Assessment and Plan:       NSTEMI (non-ST elevated myocardial infarction)    -Angina related to cocaine abuse although patient has history of CAD in the past  -Bedside echo did not show wall motion abnormalities  -This less likely to correspond to acute DVT or PE  -Would treat angina with BB as such as metoprolol and his BP as well.  -Would give ativan for angina given history of recent cocaine abuse    -Would continue to trend troponins with EKG  -IV fluid hydration  -Obtain 2D echo  -Would hold of on angiogram for now until re-evaluation after proper medical therapy for angina and blood pressure  -Cardiology will follow along          VTE Risk Mitigation (From admission, onward)        Ordered     heparin 25,000 units in dextrose 5% (100 units/ml) IV bolus from bag  Once      10/30/18 0415     heparin 25,000 units in dextrose 5% 250 mL (100 units/mL) infusion HIGH INTENSITY nomogram - [facility]  Continuous      10/30/18 0415     heparin 25,000 units in dextrose 5% (100 units/ml) IV bolus from bag  As needed (PRN)      10/30/18 0415     heparin 25,000 units in dextrose 5% 250 mL (100 units/mL) infusion HIGH INTENSITY nomogram - [facility]  As needed (PRN)      10/30/18 0415     Reason for no Mechanical VTE Prophylaxis  Once      10/30/18 0459     IP VTE HIGH RISK PATIENT  Once      10/30/18 0459          Alan Treadwell MD  Cardiology  [facility]

## 2018-10-30 NOTE — ASSESSMENT & PLAN NOTE
Unknown baseline, but with low BUN likely degree of chronic renal insufficiency.  He reports being told that he has kidney problems.  No CTA to evaluate for PE.  Caution with contrast if patient requires LHC.

## 2018-10-30 NOTE — HPI
"Mr. 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  After the show he got up to come here and noted worsening with activity.  It is mid-chest "like an ice pick" but not associated with diaphoresis, nausea, dyspnea, or pleurisy.  He notes that the sensation is identical to the last time he had a blood clot go to his lungs.  He admits that he hasn't been taking care of himself like he should be and is only partially compliant with his medications as he is often on the road; recently returning from [place] where he states he was diagnosed with a PE and was supposed to be taking pradaxa however he has not been complaint with this.  The pain has remained persistent and refuses nitroglycerin due to prior exposures causing hives. The pain improved following hydromorphone administration.  He also endorses increased RLE edema and pain, which is the leg in which he previously had had a DVT.  He also reports that he has bad arthritis in his knee and lately has fallen multiple times. He denies ETOH or tobacco abuse, occasionally uses cocaine however reports none recently. He is vague when asked about social situation or housing, stating he would like a confidential conversation with interviewing provider after he is moved from the ED, reporting that he feels the staff are "stirring things up and not being the yin to his yang". He denies SI/HI. He denies chest pain at this time and is only upset regarding NPO status. All past medical, social, and family history reviewed.     In the ED CBC stable, chemistry with ALEKSEY noting Scr 2.0, BNP negative, troponin 0.058 >> 0.071, CXR negative, EKG without acute ischemic changes, bedside echo by Cardiology was reported without WMAs, lipid panel controlled, and RLE US negative for DVT. [facility] provider " "called to the ED by patients primary nurse regarding agitation and requesting discharge, upon arrival patient conversive and pleasant with provider reporting "there are snitches down here trying to get me" and "that other doctor was rude and racist". He was amenable to discussion regarding plan of care and is in agreement to be admitted for VQ scan, ACS treatment and troponin trending, and BP control.    The patient was admitted to the Hospital Medicine Service for further evaluation and management.   "

## 2018-10-30 NOTE — SUBJECTIVE & OBJECTIVE
Patient History           Medical as of 10/30/2018     Past Medical History     Diagnosis Date Comments Source    DVT (deep venous thrombosis) -- -- Provider    High cholesterol -- -- Provider    Hypertension -- -- Provider    MI, old -- -- Provider    PE (pulmonary thromboembolism) -- -- Provider    Stroke -- -- Provider          Pertinent Negatives     Diagnosis Date Noted Comments Source    Diabetes mellitus 10/30/2018 -- Provider                  Surgical as of 10/30/2018    Past Surgical History: Patient provided no pertinent surgical history.           Family as of 10/30/2018    None           Tobacco Use as of 10/30/2018     Smoking Status Smoking Start Date Smoking Quit Date Packs/Day Years Used    Light Tobacco Smoker -- -- -- --    Types Comments Smokeless Tobacco Status Smokeless Tobacco Quit Date Source     Cigarettes -- Never Used -- Provider            Alcohol Use as of 10/30/2018     Alcohol Use Drinks/Week Alcohol/Week Comments Source    No -- -- -- Provider    Frequency Standard Drinks Binge Drinking Source      Never -- -- Provider             Drug Use as of 10/30/2018     Drug Use Types Frequency Comments Source    No -- -- -- Provider            Sexual Activity as of 10/30/2018     Sexually Active Birth Control Partners Comments Source    -- -- -- -- Provider            Activities of Daily Living as of 10/30/2018    None           Social Documentation as of 10/30/2018    None           Occupational as of 10/30/2018    None           Socioeconomic as of 10/30/2018     Marital Status Spouse Name Number of Children Years Education Education Level Preferred Language Ethnicity Race Source    Unknown -- -- -- -- English /Black Black or  --    Financial Resource Strain Food Insecurity: Worry Food Insecurity: Inability Transportation Needs: Medical Transportation Needs: Non-medical       -- -- -- -- --             Pertinent History     Question Response Comments     Lives with -- --    Place in Birth Order -- --    Lives in -- --    Number of Siblings -- --    Raised by -- --    Legal Involvement -- --    Childhood Trauma -- --    Criminal History of -- --    Financial Status -- --    Highest Level of Education -- --    Does patient have access to a firearm? -- --     Service -- --    Primary Leisure Activity -- --    Spirituality -- --        Past Medical History:   Diagnosis Date    DVT (deep venous thrombosis)     High cholesterol     Hypertension     MI, old     PE (pulmonary thromboembolism)     Stroke      History reviewed. No pertinent surgical history.  Family History     None        Tobacco Use    Smoking status: Light Tobacco Smoker     Types: Cigarettes    Smokeless tobacco: Never Used   Substance and Sexual Activity    Alcohol use: No     Frequency: Never    Drug use: No    Sexual activity: Not on file     Review of patient's allergies indicates:   Allergen Reactions    Nitroglycerin Hives       No current facility-administered medications on file prior to encounter.      Current Outpatient Medications on File Prior to Encounter   Medication Sig    aspirin 81 MG Chew Take 81 mg by mouth once daily.    atorvastatin (LIPITOR) 20 MG tablet Take 20 mg by mouth once daily.    cloNIDine 0.3 mg/24 hr td ptwk (CATAPRES) 0.3 mg/24 hr Place 1 patch onto the skin every 7 days.    isometheptene-apap-dichloralphenazone [id number] (MIDRIN) -325 mg per capsule Take 1 capsule by mouth 4 (four) times daily as needed.    magnesium citrate (CITRATE OF MAGNESIA) solution Take 296 mLs by mouth once.    multivitamin (THERAGRAN) per tablet Take 1 tablet by mouth once daily.    temazepam (RESTORIL) 22.5 MG capsule Take 22.5 mg by mouth nightly as needed for Insomnia.     Psychotherapeutics (From admission, onward)    Start     Stop Route Frequency Ordered    10/30/18 5197  diazePAM tablet 5 mg      -- Oral Every 6 hours PRN 10/30/18 1645    10/30/18 9981   "haloperidol tablet 2 mg      -- Oral Every 8 hours PRN 10/30/18 1443    10/30/18 1443  haloperidol lactate injection 5 mg      -- IV Every 6 hours PRN 10/30/18 1443        Review of Systems   Constitutional: Negative.    HENT: Negative.    Eyes: Negative.    Endocrine: Negative.    Allergic/Immunologic: Negative.    Psychiatric/Behavioral: Positive for agitation, behavioral problems and sleep disturbance. Negative for decreased concentration, dysphoric mood, hallucinations, self-injury and suicidal ideas.     Strengths and Liabilities: Strength: Patient is expressive/articulate., Strength: Patient is intelligent., Liability: Patient lacks social skills., Liability: Patient has no suport network., Liability: Patient lacks coping skills.    Objective:     Vital Signs (Most Recent):  Temp: 97.8 °F (36.6 °C) (10/30/18 0822)  Pulse: 70 (10/30/18 0600)  Resp: 19 (10/30/18 0822)  BP: 127/61 (10/30/18 0559)  SpO2: 98 % (10/30/18 0600) Vital Signs (24h Range):  Temp:  [97.4 °F (36.3 °C)-98.3 °F (36.8 °C)] 97.8 °F (36.6 °C)  Pulse:  [70-99] 70  Resp:  [18-19] 19  SpO2:  [97 %-98 %] 98 %  BP: (127-174)/(61-97) 127/61     Height: 5' 11" (180.3 cm)  Weight: 106.6 kg (235 lb)  Body mass index is 32.78 kg/m².    No intake or output data in the 24 hours ending 10/30/18 1651    Physical Exam   Constitutional: He is oriented to person, place, and time.   Neurological: He is oriented to person, place, and time. Gait normal.   Psychiatric: His behavior is normal. Cognition and memory are normal.   Psychiatric Review Of Systems - Is patient experiencing or having changes in:  sleep: yes, without restoril  appetite: no  weight: no  energy/anergy: no  interest/pleasure/anhedonia: no  somatic symptoms: no  libido: no  anxiety/panic: yes, without ativan  guilty/hopelessness: no  concentration: no  S.I.B.s/risky behavior: no  Irritability: yes, without ativan  Racing thoughts: no  Impulsive behaviors: no  Paranoia:no  AVH:no     NEUROLOGICAL " EXAMINATION:     MENTAL STATUS   Oriented to person, place, and time.   Level of consciousness: alert       Appearance: unremarkable, age appropriate, well nourished, lying in bed, dressed in paper scrubs  Behavior/Copperation: cooperative  Speech: normal tone, normal rate, normal pitch, normal volume, spontaneous  Mood:  upset  Affect: mood-congruent  Thought Process: tangential, perseverative  Thought Content: normal, no suicidality, no homicidality, delusions, or paranoia  Orientation:  grossly intact  Memory: Grossly intact  Attention/Concentration:  Normal  Cognition: grossly intact  Insight: fair  Judgement: fair  Reliability: questionable       GAIT AND COORDINATION     Gait  Gait: normal    Significant Labs: Urine Drug screen pending collection    Significant Imaging: Chest x-ray unremarkable

## 2018-10-30 NOTE — CONSULTS
Attempted to see patient re: cardiac rehab but was confronted by nurses outside of room in hallway. Patient has been disagreeable to IV heparin and attempt at labs. Patient has been uncooperative and security is in attendance also. Consult deferred until patient more compliant with care.

## 2018-10-30 NOTE — ASSESSMENT & PLAN NOTE
Mildly elevated troponin with ongoing chest pain.  Seems exertional but unable to use nitroglycerin to probe for relief.  Discussed with Cardiology fellow; if 2nd troponin (pending) increases with ongoing chest pain contact Interventional Cardiology to activate cath lab.  US LE pending to evaluate for DVT; will get V/Q scan to evaluate for PE.  NSTEMI pathway initiated with low-intensity heparin infusion protocol in event LHC warranted.

## 2018-10-30 NOTE — HPI
"Per ED MD:  "68 y.o. M p/w chest pain. Hx of MI in the past x2, also DVT and PE diagnosed a few months ago. 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 +shortness of breath, no nausea or diaphoresis. He says it feels the exact same as when he had an MI a few years ago. Endorses "un poquito" cocaine use, however denies using it tonight. 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  After the show he got up to come here and noted worsening with activity.  It is mid-chest "like an ice pick" but not associated with diaphoresis, nausea, dyspnea, or pleurisy.  He notes that the sensation is identical to the last time he had a blood clot go to his lungs.  He admits that he hasn't been taking care of himself like he should be and is only partially compliant with his medications as he is often on the road.  The pain has remained persistent and refuses nitroglycerin due to prior exposures causing hives.  The pain improved following hydromorphone administration but has not resolved, and repeatedly dozes off during interview.  He also endorses increased RLE edema and pain, which is the leg in which he previously had had a DVT.  He also reports that he has bad arthritis in his knee and lately has fallen multiple times."    On my interview:  Patient is tangential and perseverating on the events of early today. He reports he came in for his heart; "they ran " "12 testes last night which I was NPO for." He reports already being "npo" for several hours prior to this hospitalization as a result of his job as a . He reports after "16 hours" of being NPO he had enough and needed to eat. He became upset when the doctor confronted him about refusing tests without asking him why he was refusing them. He feel he was "hurt my injustice" and staff here "dont respect the sovereignty and prestige of this establishment." he admits to "calling them name because they were being unneccessary snitches." He went on to expressed issues with security, nursing staff, and the "practitioners" on his care team. He reports he want to remain in the hospital and receive treatment, however, he appears to have an unrealistic expectation of what a hospital stay involves.      Regarding psychiatric history patient was evasive in his details. He reports he takes Restoril 30 mg and Ativan 5 mg at bedtime. He reports he is unable to take "any psychotropics or antidepressants because they enhance my negativity." Previous medications include Mellaril, Wellbutrin. Zoloft, Prolixin, Tegretol, Risperdal, Haldol, and Thorazine. He reports his psychiatric diagnoses are sleep disturbances and dementia. Denies previous hospitalization, past or present SI/HI/AV/VH, delusions, or paranoia. He denies history depression. Regarding manic symptoms he reports history of going 2 weeks without sleep (denies drug use during that time); denies FOI, racing thoughts, impulsive/risk taking behaviors. He uses cocaine during holidays ("christmas and new years") only.    Interview terminated prematurely because patient taken to nuclear med test.   "

## 2018-10-30 NOTE — SUBJECTIVE & OBJECTIVE
Interval History:     Mr Ramirez is a 68 y.o. M with medical history significant for HTN, chronic tobacco abuse, cocaine abuse with last use one day ago, history of DVT on pradaxa but not currently taking who came in with complaints of chest pain over the past couple of hours. Patient is a  and was practicing last night when he suddenly start to develop pressure like chest pain.Pain was 8/10 in intensity located in the left side of his chest. Denies irradiation. Pain was similar to his last heart attack episode which was 13   years ago.   Upon ED presentation, he was presenting with angina, could not take nitro because of allergic reaction (hives).He was given clonidine which is his home medicine for BP. Metoprolol IV 10 mg was given for relief of his angina. Cr was 2.0. EKG showed SR with :VH and repolarization abnormalities+ LAD. Cardiology was consulted for further work up.       Review of Systems   Constitution: Negative.   HENT: Negative.    Eyes: Negative.    Cardiovascular: Positive for chest pain and dyspnea on exertion. Negative for claudication, cyanosis, irregular heartbeat, leg swelling, near-syncope, orthopnea, palpitations, paroxysmal nocturnal dyspnea and syncope.   Respiratory: Positive for shortness of breath. Negative for cough, hemoptysis, sleep disturbances due to breathing, snoring, sputum production and wheezing.    Endocrine: Negative for cold intolerance, heat intolerance, polydipsia and polyphagia.   Hematologic/Lymphatic: Negative.    Gastrointestinal: Negative.    Genitourinary: Negative.      Objective:     Vital Signs (Most Recent):  Temp: 97.4 °F (36.3 °C) (10/30/18 0349)  Pulse: 84 (10/30/18 0349)  Resp: 18 (10/30/18 0349)  BP: (!) 174/97 (10/30/18 0349)  SpO2: 98 % (10/30/18 0349) Vital Signs (24h Range):  Temp:  [97.4 °F (36.3 °C)-98.3 °F (36.8 °C)] 97.4 °F (36.3 °C)  Pulse:  [84-99] 84  Resp:  [18] 18  SpO2:  [97 %-98 %] 98 %  BP: (159-174)/(86-97) 174/97     Weight: 106.6  kg (235 lb)  Body mass index is 32.78 kg/m².     SpO2: 98 %  O2 Device (Oxygen Therapy): room air    No intake or output data in the 24 hours ending 10/30/18 0444    Lines/Drains/Airways     Peripheral Intravenous Line                 Peripheral IV - Single Lumen 10/30/18 0310 Right Hand less than 1 day                Physical Exam   Constitutional: He is oriented to person, place, and time. He appears well-developed and well-nourished.   HENT:   Head: Normocephalic and atraumatic.   Neck: Neck supple. No JVD present. No thyromegaly present.   Cardiovascular: Normal rate, regular rhythm, normal heart sounds and intact distal pulses.   Pulmonary/Chest: Breath sounds normal. No respiratory distress. He has no wheezes. He has no rales.   Abdominal: Soft. Bowel sounds are normal. He exhibits no distension. There is no tenderness. There is no rebound.   Musculoskeletal: Normal range of motion.   Neurological: He is oriented to person, place, and time.   Skin: Skin is warm.   Nursing note and vitals reviewed.      Significant Labs:   BMP:   Recent Labs   Lab 10/30/18  0310   GLU 96      K 4.3   *   CO2 20*   BUN 22   CREATININE 2.0*   CALCIUM 10.1   , CMP   Recent Labs   Lab 10/30/18  0310      K 4.3   *   CO2 20*   GLU 96   BUN 22   CREATININE 2.0*   CALCIUM 10.1   PROT 8.4   ALBUMIN 4.0   BILITOT 0.4   ALKPHOS 83   AST 19   ALT 21   ANIONGAP 11   ESTGFRAFRICA 38.5*   EGFRNONAA 33.3*   , CBC   Recent Labs   Lab 10/30/18  0310   WBC 10.56   HGB 13.0*   HCT 39.6*      , Lipid Panel No results for input(s): CHOL, HDL, LDLCALC, TRIG, CHOLHDL in the last 48 hours., Troponin   Recent Labs   Lab 10/30/18  0310   TROPONINI 0.047*  0.058*    and All pertinent lab results from the last 24 hours have been reviewed.    Significant Imaging: Echocardiogram: 2D echo with color flow doppler: No results found for this or any previous visit.

## 2018-10-31 VITALS
WEIGHT: 235 LBS | BODY MASS INDEX: 32.9 KG/M2 | HEIGHT: 71 IN | DIASTOLIC BLOOD PRESSURE: 82 MMHG | TEMPERATURE: 98 F | HEART RATE: 72 BPM | SYSTOLIC BLOOD PRESSURE: 142 MMHG | OXYGEN SATURATION: 96 % | RESPIRATION RATE: 18 BRPM

## 2018-10-31 LAB
ANION GAP SERPL CALC-SCNC: 5 MMOL/L
APTT BLDCRRT: 23.9 SEC
BASOPHILS # BLD AUTO: 0.04 K/UL
BASOPHILS NFR BLD: 0.5 %
BUN SERPL-MCNC: 20 MG/DL
CALCIUM SERPL-MCNC: 8.7 MG/DL
CHLORIDE SERPL-SCNC: 112 MMOL/L
CO2 SERPL-SCNC: 22 MMOL/L
CREAT SERPL-MCNC: 1.4 MG/DL
DIFFERENTIAL METHOD: ABNORMAL
EOSINOPHIL # BLD AUTO: 0.2 K/UL
EOSINOPHIL NFR BLD: 2.2 %
ERYTHROCYTE [DISTWIDTH] IN BLOOD BY AUTOMATED COUNT: 14.9 %
EST. GFR  (AFRICAN AMERICAN): 59.3 ML/MIN/1.73 M^2
EST. GFR  (NON AFRICAN AMERICAN): 51.3 ML/MIN/1.73 M^2
GLUCOSE SERPL-MCNC: 104 MG/DL
HCT VFR BLD AUTO: 35.5 %
HGB BLD-MCNC: 11.2 G/DL
IMM GRANULOCYTES # BLD AUTO: 0.03 K/UL
IMM GRANULOCYTES NFR BLD AUTO: 0.4 %
LYMPHOCYTES # BLD AUTO: 2 K/UL
LYMPHOCYTES NFR BLD: 27.4 %
MAGNESIUM SERPL-MCNC: 2.5 MG/DL
MCH RBC QN AUTO: 28.7 PG
MCHC RBC AUTO-ENTMCNC: 31.5 G/DL
MCV RBC AUTO: 91 FL
MONOCYTES # BLD AUTO: 0.7 K/UL
MONOCYTES NFR BLD: 9.3 %
NEUTROPHILS # BLD AUTO: 4.4 K/UL
NEUTROPHILS NFR BLD: 60.2 %
NRBC BLD-RTO: 0 /100 WBC
PLATELET # BLD AUTO: 253 K/UL
PMV BLD AUTO: 10.8 FL
POTASSIUM SERPL-SCNC: 4 MMOL/L
RBC # BLD AUTO: 3.9 M/UL
SODIUM SERPL-SCNC: 139 MMOL/L
TROPONIN I SERPL DL<=0.01 NG/ML-MCNC: 0.04 NG/ML
WBC # BLD AUTO: 7.34 K/UL

## 2018-10-31 PROCEDURE — 85025 COMPLETE CBC W/AUTO DIFF WBC: CPT

## 2018-10-31 PROCEDURE — 36415 COLL VENOUS BLD VENIPUNCTURE: CPT

## 2018-10-31 PROCEDURE — 25000003 PHARM REV CODE 250: Performed by: NURSE PRACTITIONER

## 2018-10-31 PROCEDURE — 80048 BASIC METABOLIC PNL TOTAL CA: CPT

## 2018-10-31 PROCEDURE — 93005 ELECTROCARDIOGRAM TRACING: CPT

## 2018-10-31 PROCEDURE — 83735 ASSAY OF MAGNESIUM: CPT

## 2018-10-31 PROCEDURE — 84484 ASSAY OF TROPONIN QUANT: CPT

## 2018-10-31 PROCEDURE — 85730 THROMBOPLASTIN TIME PARTIAL: CPT

## 2018-10-31 RX ADMIN — DIAZEPAM 5 MG: 5 TABLET ORAL at 12:10

## 2018-10-31 NOTE — ASSESSMENT & PLAN NOTE
-lipid panel control  -continue statin  -encouraged low sodium heart healthy diet and increased physical activity

## 2018-10-31 NOTE — SUBJECTIVE & OBJECTIVE
"Interval History: Resting on bedside of stretcher in ED, he voiced concerns with his treatment and is upset regarding NPO status and feels "nobody knows what to do with him other than cause problems". He expresses a desire to tell provider a confidential secret once he is in a patient room and requests provider not to breach his confidentiality because the " is stirring the pot and snitching on him". He verbalizes understanding for troponin trending, heparin gtt and monitoring, as well as VQ scan. He denies chest pain, palpitations, or shortness of breath. Denies any acute events or distress overnight.     Review of Systems   Constitutional: Negative for activity change, appetite change, chills, diaphoresis, fatigue, fever and unexpected weight change.   HENT: Negative for congestion, sinus pressure, sinus pain, sneezing and sore throat.    Respiratory: Negative for cough, chest tightness, shortness of breath and wheezing.    Cardiovascular: Positive for leg swelling. Negative for chest pain and palpitations.   Gastrointestinal: Negative for abdominal distention, abdominal pain, constipation, diarrhea, nausea and vomiting.   Genitourinary: Negative for decreased urine volume, difficulty urinating and urgency.   Musculoskeletal: Negative for arthralgias, back pain, gait problem, joint swelling, myalgias, neck pain and neck stiffness.   Skin: Negative for color change, pallor, rash and wound.   Neurological: Negative for dizziness, syncope, weakness and light-headedness.   Psychiatric/Behavioral: Positive for sleep disturbance. Negative for agitation, behavioral problems, confusion, dysphoric mood, hallucinations and suicidal ideas. The patient is nervous/anxious.      Objective:     Vital Signs (Most Recent):  Temp: 97.8 °F (36.6 °C) (10/30/18 2012)  Pulse: 71 (10/30/18 2012)  Resp: 18 (10/30/18 2012)  BP: 137/74 (10/30/18 2012)  SpO2: 95 % (10/30/18 2012) Vital Signs (24h Range):  Temp:  [97.4 °F (36.3 " °C)-98.7 °F (37.1 °C)] 97.8 °F (36.6 °C)  Pulse:  [70-99] 71  Resp:  [16-19] 18  SpO2:  [95 %-98 %] 95 %  BP: (127-174)/(61-97) 137/74     Weight: 106.6 kg (235 lb)  Body mass index is 32.78 kg/m².  No intake or output data in the 24 hours ending 10/30/18 2108   Physical Exam   Constitutional: He is oriented to person, place, and time. He appears well-developed and well-nourished.   HENT:   Head: Normocephalic and atraumatic.   Mouth/Throat: Mucous membranes are normal. Normal dentition.   Eyes: Conjunctivae and lids are normal. Pupils are equal, round, and reactive to light. No scleral icterus.   Neck: Normal range of motion. Neck supple. No JVD present.   Cardiovascular: Normal rate, regular rhythm, normal heart sounds and intact distal pulses. Exam reveals no gallop and no friction rub.   No murmur heard.  Pulmonary/Chest: Effort normal and breath sounds normal. He exhibits no tenderness.   Abdominal: Soft. Bowel sounds are normal. He exhibits no distension. There is no tenderness.   Musculoskeletal: Normal range of motion. He exhibits edema (edema (RLE mildly enlarged compared to LLE). He exhibits no deformity.   Neurological: He is alert and oriented to person, place, and time. No cranial nerve deficit.   Skin: Skin is warm and dry. Capillary refill takes less than 2 seconds. No rash noted. No erythema.   Psychiatric: He has a normal mood and affect. Judgment and thought content normal.   Nursing note and vitals reviewed.    Significant Labs:   A1C:   Recent Labs   Lab 10/30/18  0705   HGBA1C 6.0*     CBC:   Recent Labs   Lab 10/30/18  0310   WBC 10.56   HGB 13.0*   HCT 39.6*        CMP:   Recent Labs   Lab 10/30/18  0310 10/30/18  0705    140   K 4.3 3.9   * 110   CO2 20* 21*   GLU 96 94   BUN 22 22   CREATININE 2.0* 1.8*   CALCIUM 10.1 9.2   PROT 8.4  --    ALBUMIN 4.0  --    BILITOT 0.4  --    ALKPHOS 83  --    AST 19  --    ALT 21  --    ANIONGAP 11 9   EGFRNONAA 33.3* 37.8*     Cardiac  Markers:   Recent Labs   Lab 10/30/18  0310   BNP 10     Coagulation:   Recent Labs   Lab 10/30/18  0400 10/30/18  1354   INR 1.0  --    APTT 25.3 41.3*     Lipid Panel:   Recent Labs   Lab 10/30/18  0705   CHOL 194   HDL 53   LDLCALC 131.8   TRIG 46   CHOLHDL 27.3     Magnesium:   Recent Labs   Lab 10/30/18  0705   MG 2.2     Troponin:   Recent Labs   Lab 10/30/18  0310 10/30/18  0705 10/30/18  1354   TROPONINI 0.047*  0.058* 0.071* 0.050*     All pertinent labs within the past 24 hours have been reviewed.    Significant Imaging: I have reviewed all pertinent imaging results/findings within the past 24 hours.

## 2018-10-31 NOTE — CARE UPDATE
"Called to room by primary RN as patient was refusing lab draws and to be transported to VQ scan, asking everyone to leave the room. Upon entry to room with primary RN, provider approached bedside and kindly called patients name "Mr. Ramirez", he immediately became agitated and was speaking rapidly, mostly not understood by provider. Asked if we could discuss his plan of care and testing, to which he replied angrily for provider "to back up and get her "explicit" out of his face", provider stepped back. He explained lab just vern blood, and when provider asked approximate time of draw, was informed "did not have the right to question him", reported was not questioning his report was simply trying to gauge a timeline. Asked if he would be willing to go for scan and he noted "we have done plenty of tests without letting him eat and now he is eating his first meal" (he ate in the ED and again in the med/surg bed), again telling provider to "back the "explicit" up and then aggressively raised his arm and situated himself on the bedside". He then begin verbally insulting primary RNs and MATT, he requested MATT and RN leave the room to speak to unit director and patient advocate, MATT and RN complied with request. During time Unit Director was in the room, patient divulged a psychiatric history and has not been taking his medications. Security came to bedside for behavior management and to address verbal abuse of staff and questionable hostile behavior, he again became agitated upon their arrival with verbal abuse to all staff present. Psychiatry consulted due to acute change in mentation and expressed history, pending their evaluation and recommendations. Patient demanded new provider and was visited by patient advocate who supported his request. MATT reviewed case with collaborating physcian who will assume care.    Pending/to follow up: troponin trend and/or re-evaluation by Interventional Cardiology for LHC, ALEKSEY monitoring, " VQ scan, assess for DOAC/OAC need, and BP control.    Disposition planning: if amenable can consider re-evaluation by Interventional Cardiology due to echo abnormalities in setting of positive troponin with reported history of CAD for [facility] otherwise medical management with GDMT and Cardiology follow up. Due to reported history of CVA, PE, and DVT should consider triple therapy with DOAC.        Christine Ayon, ERIK, AG-ACNP, BC  Department of Hospital Medicine  [facility]

## 2018-10-31 NOTE — ASSESSMENT & PLAN NOTE
-at admission CBC stable, chemistry with ALEKSEY noting Scr 2.0, BNP negative, troponin 0.058 >> 0.071, CXR negative, EKG without acute ischemic changes, bedside echo by Cardiology was reported without WMAs, lipid panel controlled, and RLE US negative for DVT  -Interventional Cardiology defers [facility] at this time and recommends trop trend, 2D echo, and optimization of BP management  -initiated on carvedilol, with noted improvement in BP  -continue troponin trend as patient allows  -2D echo obtained with EF 40%, concentric hypertrophy, mild LAE, RAP 3, and multiple WMAs; no prior studies to compare. -continue medical management of NSTEMI with ASA, plavix, statin, and heparin gtt for 48 hours  -VQ scan to rule out acute PE as patient reports previous PE/DVT and non compliance with pradaxa  -HEART score 6 with moderate risk, continue observation  -encouraged medication compliance   -continue tele monitoring  -EKG PRN chest pain  -cardiac diet in house  -Cardiac Rehab referral   -outpt follow up with Cardiology at [facility]

## 2018-10-31 NOTE — ASSESSMENT & PLAN NOTE
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

## 2018-10-31 NOTE — HOSPITAL COURSE
"Mr. Ramirez was admitted 10/30 due to chest pain. Interventional Cardiology defers C at this time and recommends trop trend, 2D echo, and optimization of BP management. Patient initiated on carvedilol, with noted improvement in BP. Will continue troponin trend as patient allows. 2D echo obtained with EF 40%, concentric hypertrophy, mild LAE, RAP 3, and multiple WMAs; no prior studies to compare. Continue medical management of NSTEMI with ASA, plavix, and statin. VQ scan to rule out acute PE as patient reports previous PE/DVT and non compliance with pradaxa.     Disposition plans: pending development of treatment plan, unsure of home needs at this time, SW and CM following. Will need outpt follow up, [facility] because "[facility] of $18,000.   "

## 2018-10-31 NOTE — DISCHARGE SUMMARY
"Discharge Summary  Hospital Medicine    Attending Provider on Discharge: Geronimo Puente     Discharging Team: [facility]     Date of Admission:  10/30/2018         Date of Discharge:  10/31/2018      Diagnoses:     Principal Problem(s):   NSTEMI (non-ST elevated myocardial infarction)     Secondary Problems:  Active Hospital Problems    Diagnosis    *NSTEMI (non-ST elevated myocardial infarction)    ALEKSEY (acute kidney injury)    Poor historian    Paranoia    Hypertension    HLD (hyperlipidemia)        Hospital Course:      Please note: I did not physically see the patient. AMELIA Ayon was seeing the patient.  However, the patient was being verbally abusive towards AMELIA Ayon as well as staff. Decision made to transfer patient [facility] ([facility]).  As I was making my way up to see patient I was notified by nursing that patient eloped against medical advice. Patient was determined by previous providers to have capacity and fair judgement.  The details provided below are thus the assessment and plan of AMELIA Ayon which she had discussed with me prior and which I agree with. As I have not physically seen the patient there is no charge affiliated with this discharge note.    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  After the show he got up to come here and noted worsening with activity.  It is mid-chest "like an ice pick" but not associated with diaphoresis, nausea, dyspnea, or pleurisy.  He notes that the sensation is identical to the last time he had a blood clot go to his lungs.  He admits that he hasn't been taking care of himself like he should be and is only partially compliant with his medications as he is often on the road; recently returning from [place] where he states he was diagnosed with a PE and was " "supposed to be taking pradaxa however he has not been complaint with this.  The pain has remained persistent and refuses nitroglycerin due to prior exposures causing hives. The pain improved following hydromorphone administration.  He also endorses increased RLE edema and pain, which is the leg in which he previously had had a DVT.  He also reports that he has bad arthritis in his knee and lately has fallen multiple times. He denies ETOH or tobacco abuse, occasionally uses cocaine however reports none recently. He is vague when asked about social situation or housing, stating he would like a confidential conversation with interviewing provider after he is moved from the ED, reporting that he feels the staff are "stirring things up and not being the yin to his yang". He denies SI/HI. He denies chest pain at this time and is only upset regarding NPO status. All past medical, social, and family history reviewed.      In the ED CBC stable, chemistry with ALEKSEY noting Scr 2.0, BNP negative, troponin 0.058 >> 0.071, CXR negative, EKG without acute ischemic changes, bedside echo by Cardiology was reported without WMAs, lipid panel controlled, and RLE US negative for DVT. J provider called to the ED by patients primary nurse regarding agitation and requesting discharge, upon arrival patient conversive and pleasant with provider reporting "there are snitches down here trying to get me" and "that other doctor was rude and racist". He was amenable to discussion regarding plan of care and is in agreement to be admitted for VQ scan, ACS treatment and troponin trending, and BP control.     The patient was admitted to the Hospital Medicine Service for further evaluation and management.      Hospital Course:  Mr. Ramirez was admitted 10/30 due to chest pain. Interventional Cardiology defers [facility] at this time and recommends trop trend, 2D echo, and optimization of BP management. Patient initiated on carvedilol, with noted improvement in " BP. Will continue troponin trend as patient allows. 2D echo obtained with EF 40%, concentric hypertrophy, mild LAE, RAP 3, and multiple WMAs; no prior studies to compare. Continue medical management of NSTEMI with ASA, plavix, and statin. VQ scan to rule out acute PE as patient reports previous PE/DVT and non compliance with pradaxa.      Given reported history of recent PE as well as admission for NSTEMI, plan was to treat with triple therapy i.e. DOAC and DAPT. However, patient did not stay for continued medical treatment. No printed prescriptions were able to be provided upon d/c.      Significant Diagnostic Studies:   Labs:   Recent Labs     10/31/18  0641   WBC 7.34   RBC 3.90*   HGB 11.2*   HCT 35.5*      MCV 91   MCH 28.7   MCHC 31.5*   GRAN 60.2  4.4   LYMPH 27.4  2.0   MONO 9.3  0.7   EOS 0.2      Recent Labs     10/31/18  0641         K 4.0   *   CO2 22*   BUN 20   CREATININE 1.4   CALCIUM 8.7   ANIONGAP 5*   MG 2.5     Radiology:   Results for orders placed during the hospital encounter of 10/30/18   X-Ray Chest 1 View    Narrative EXAMINATION:  XR CHEST 1 VIEW    CLINICAL HISTORY:  Chest pain, unspecified    TECHNIQUE:  Single frontal view of the chest was performed.    COMPARISON:  None.    FINDINGS:  There is mild rightward tracheal deviation, presumably related to the aortic arch.  Cardiac silhouette is magnified by AP technique.  Hilar contours are normal.  Lung volumes are symmetric.  No consolidation.  No pneumothorax or pleural effusions.  No free air beneath the diaphragm.  No acute osseous abnormalities.  Degenerative changes of the spine noted.      Impression 1. No acute radiographic findings in the chest on this single view.      Electronically signed by: Arnold Love MD  Date:    10/30/2018  Time:    05:48     Cardiac Studies: 2D ECHO    Significant Treatments/Procedures:   V/Q scan completed, negative for PE  Patient treated for NSTEMI    Consults while in  Hospital:   Cardiology and Psychiatry    Discharge Medications:      Discharge Medication List as of 10/31/2018 10:58 AM      START taking these medications    Details   ticagrelor (BRILINTA) 90 mg tablet Take 1 tablet (90 mg total) by mouth 2 (two) times daily., Starting Tue 10/30/2018, Until Wed 10/30/2019, Print         CONTINUE these medications which have NOT CHANGED    Details   aspirin 81 MG Chew Take 81 mg by mouth once daily., Historical Med      atorvastatin (LIPITOR) 20 MG tablet Take 20 mg by mouth once daily., Historical Med      cloNIDine 0.3 mg/24 hr td ptwk (CATAPRES) 0.3 mg/24 hr Place 1 patch onto the skin every 7 days., Historical Med      isometheptene-apap-dichloralphenazone [id number] (MIDRIN) -325 mg per capsule Take 1 capsule by mouth 4 (four) times daily as needed., Historical Med      magnesium citrate (CITRATE OF MAGNESIA) solution Take 296 mLs by mouth once., Historical Med      multivitamin (THERAGRAN) per tablet Take 1 tablet by mouth once daily., Historical Med      temazepam (RESTORIL) 22.5 MG capsule Take 22.5 mg by mouth nightly as needed for Insomnia., Historical Med           Discharge Diet:cardiac diet with Normal Fluid intake of 1500 - 2000 mL per day    Activity: activity as tolerated    Discharged Condition: Fair    Discharge Disposition: Left Against Medical Advice    Follow-up:   Appointments could not be established for patient as left AMA.    Studies/Results pending on discharge:   None    Geronimo Puente MD  [facility]

## 2018-10-31 NOTE — NURSING
Pt free of falls/injuries throughout the shift. Bed locked, in lowest position, call bell within reach. Pt afebrile, pain assessed & denied. Pt caused multiple disturbances throughout the night.  Cursing at transport, lab, Charge RN, roommate.  Pt denied all labs, vitals and meds except for Valum.

## 2018-10-31 NOTE — PROGRESS NOTES
"[facility] Hospital Medicine  Progress Note    Patient Name: Denis Ramirez  MRN: [id number]  Patient Class: IP- Inpatient   Admission Date: 10/30/2018  Length of Stay: 0 days  Attending Physician: Geronimo Puente MD  Primary Care Provider: Primary Doctor [facility] Medicine Team: [facility] J Christine Ayon NP    Subjective:     Principal Problem:NSTEMI (non-ST elevated myocardial infarction)    HPI:  Mr. 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  After the show he got up to come here and noted worsening with activity.  It is mid-chest "like an ice pick" but not associated with diaphoresis, nausea, dyspnea, or pleurisy.  He notes that the sensation is identical to the last time he had a blood clot go to his lungs.  He admits that he hasn't been taking care of himself like he should be and is only partially compliant with his medications as he is often on the road; recently returning from [place] where he states he was diagnosed with a PE and was supposed to be taking pradaxa however he has not been complaint with this.  The pain has remained persistent and refuses nitroglycerin due to prior exposures causing hives. The pain improved following hydromorphone administration.  He also endorses increased RLE edema and pain, which is the leg in which he previously had had a DVT.  He also reports that he has bad arthritis in his knee and lately has fallen multiple times. He denies ETOH or tobacco abuse, occasionally uses cocaine however reports none recently. He is vague when asked about social situation or housing, stating he would like a confidential conversation with interviewing provider after he is moved from the ED, reporting that he feels the staff are "stirring things up and not being the " "yin to his yang". He denies SI/HI. He denies chest pain at this time and is only upset regarding NPO status. All past medical, social, and family history reviewed.     In the ED CBC stable, chemistry with ALEKSEY noting Scr 2.0, BNP negative, troponin 0.058 >> 0.071, CXR negative, EKG without acute ischemic changes, bedside echo by Cardiology was reported without WMAs, lipid panel controlled, and RLE US negative for DVT. [facility] provider called to the ED by patients primary nurse regarding agitation and requesting discharge, upon arrival patient conversive and pleasant with provider reporting "there are snitches down here trying to get me" and "that other doctor was rude and racist". He was amenable to discussion regarding plan of care and is in agreement to be admitted for VQ scan, ACS treatment and troponin trending, and BP control.    The patient was admitted to the Hospital Medicine Service for further evaluation and management.     Hospital Course:  Mr. Ramirez was admitted 10/30 due to chest pain. Interventional Cardiology defers [facility] at this time and recommends trop trend, 2D echo, and optimization of BP management. Patient initiated on carvedilol, with noted improvement in BP. Will continue troponin trend as patient allows. 2D echo obtained with EF 40%, concentric hypertrophy, mild LAE, RAP 3, and multiple WMAs; no prior studies to compare. Continue medical management of NSTEMI with ASA, plavix, and statin. VQ scan to rule out acute PE as patient reports previous PE/DVT and non compliance with pradaxa.     Disposition plans: pending development of treatment plan, unsure of home needs at this time, [facility] and [facility] following. Will need outpt follow up, [facility]18,000.     Interval History: Resting on bedside of stretcher in ED, he voiced concerns with his treatment and is upset regarding NPO status and feels "nobody knows what to do with him other than cause problems". " "He expresses a desire to tell provider a confidential secret once he is in a patient room and requests provider not to breach his confidentiality because the " is stirring the pot and snitching on him". He verbalizes understanding for troponin trending, heparin gtt and monitoring, as well as VQ scan. He denies chest pain, palpitations, or shortness of breath. Denies any acute events or distress overnight.     Review of Systems   Constitutional: Negative for activity change, appetite change, chills, diaphoresis, fatigue, fever and unexpected weight change.   HENT: Negative for congestion, sinus pressure, sinus pain, sneezing and sore throat.    Respiratory: Negative for cough, chest tightness, shortness of breath and wheezing.    Cardiovascular: Positive for leg swelling. Negative for chest pain and palpitations.   Gastrointestinal: Negative for abdominal distention, abdominal pain, constipation, diarrhea, nausea and vomiting.   Genitourinary: Negative for decreased urine volume, difficulty urinating and urgency.   Musculoskeletal: Negative for arthralgias, back pain, gait problem, joint swelling, myalgias, neck pain and neck stiffness.   Skin: Negative for color change, pallor, rash and wound.   Neurological: Negative for dizziness, syncope, weakness and light-headedness.   Psychiatric/Behavioral: Positive for sleep disturbance. Negative for agitation, behavioral problems, confusion, dysphoric mood, hallucinations and suicidal ideas. The patient is nervous/anxious.      Objective:     Vital Signs (Most Recent):  Temp: 97.8 °F (36.6 °C) (10/30/18 2012)  Pulse: 71 (10/30/18 2012)  Resp: 18 (10/30/18 2012)  BP: 137/74 (10/30/18 2012)  SpO2: 95 % (10/30/18 2012) Vital Signs (24h Range):  Temp:  [97.4 °F (36.3 °C)-98.7 °F (37.1 °C)] 97.8 °F (36.6 °C)  Pulse:  [70-99] 71  Resp:  [16-19] 18  SpO2:  [95 %-98 %] 95 %  BP: (127-174)/(61-97) 137/74     Weight: 106.6 kg (235 lb)  Body mass index is 32.78 kg/m².  No " intake or output data in the 24 hours ending 10/30/18 2108   Physical Exam   Constitutional: He is oriented to person, place, and time. He appears well-developed and well-nourished.   HENT:   Head: Normocephalic and atraumatic.   Mouth/Throat: Mucous membranes are normal. Normal dentition.   Eyes: Conjunctivae and lids are normal. Pupils are equal, round, and reactive to light. No scleral icterus.   Neck: Normal range of motion. Neck supple. No JVD present.   Cardiovascular: Normal rate, regular rhythm, normal heart sounds and intact distal pulses. Exam reveals no gallop and no friction rub.   No murmur heard.  Pulmonary/Chest: Effort normal and breath sounds normal. He exhibits no tenderness.   Abdominal: Soft. Bowel sounds are normal. He exhibits no distension. There is no tenderness.   Musculoskeletal: Normal range of motion. He exhibits edema (edema (RLE mildly enlarged compared to LLE). He exhibits no deformity.   Neurological: He is alert and oriented to person, place, and time. No cranial nerve deficit.   Skin: Skin is warm and dry. Capillary refill takes less than 2 seconds. No rash noted. No erythema.   Psychiatric: He has a normal mood and affect. Judgment and thought content normal.   Nursing note and vitals reviewed.    Significant Labs:   A1C:   Recent Labs   Lab 10/30/18  0705   HGBA1C 6.0*     CBC:   Recent Labs   Lab 10/30/18  0310   WBC 10.56   HGB 13.0*   HCT 39.6*        CMP:   Recent Labs   Lab 10/30/18  0310 10/30/18  0705    140   K 4.3 3.9   * 110   CO2 20* 21*   GLU 96 94   BUN 22 22   CREATININE 2.0* 1.8*   CALCIUM 10.1 9.2   PROT 8.4  --    ALBUMIN 4.0  --    BILITOT 0.4  --    ALKPHOS 83  --    AST 19  --    ALT 21  --    ANIONGAP 11 9   EGFRNONAA 33.3* 37.8*     Cardiac Markers:   Recent Labs   Lab 10/30/18  0310   BNP 10     Coagulation:   Recent Labs   Lab 10/30/18  0400 10/30/18  1354   INR 1.0  --    APTT 25.3 41.3*     Lipid Panel:   Recent Labs   Lab  10/30/18  0705   CHOL 194   HDL 53   LDLCALC 131.8   TRIG 46   CHOLHDL 27.3     Magnesium:   Recent Labs   Lab 10/30/18  0705   MG 2.2     Troponin:   Recent Labs   Lab 10/30/18  0310 10/30/18  0705 10/30/18  1354   TROPONINI 0.047*  0.058* 0.071* 0.050*     All pertinent labs within the past 24 hours have been reviewed.    Significant Imaging: I have reviewed all pertinent imaging results/findings within the past 24 hours.    Assessment/Plan:      * NSTEMI (non-ST elevated myocardial infarction)    -at admission CBC stable, chemistry with ALEKSEY noting Scr 2.0, BNP negative, troponin 0.058 >> 0.071, CXR negative, EKG without acute ischemic changes, bedside echo by Cardiology was reported without WMAs, lipid panel controlled, and RLE US negative for DVT  -Interventional Cardiology defers [facility] at this time and recommends trop trend, 2D echo, and optimization of BP management  -initiated on carvedilol, with noted improvement in BP  -continue troponin trend as patient allows  -2D echo obtained with EF 40%, concentric hypertrophy, mild LAE, RAP 3, and multiple WMAs; no prior studies to compare. 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  -continue hydration with IVF and monitor renal function      Hypertension    -unknown history of control or medications  -reports non compliance with home clonidine  -discontinue  "clonidine  -initiated carvedilol for BP management in setting of potential cocaine abuse  -encouraged low sodium diet and increasing physical activity   -monitor      HLD (hyperlipidemia)    -lipid panel control  -continue statin  -encouraged low sodium heart healthy diet and increased physical activity      Poor historian    -vague and appears unwilling/uncomfortable with providing information  -verbalizing portions of his history, social situation, etc to different providers  -unwilling to be interviewed in traditional sense, prefers to "tell his story"  -continue to obtain past medical, social, and home medications as patient will allow       Paranoia    -exhibits paranoia see HPI  -continue to monitor        VTE Risk Mitigation (From admission, onward)        Ordered     enoxaparin injection 110 mg  Every 12 hours (non-standard times)      10/30/18 1445     Reason for No Pharmacological VTE Prophylaxis  Once      10/30/18 0633     IP VTE HIGH RISK PATIENT  Once      10/30/18 0633     Reason for no Mechanical VTE Prophylaxis  Once      10/30/18 0258          Christine Ayon, ERIK, AG-ACNP, BC  Department of Hospital Medicine  [facility]  Pager [phone] [id number]  "

## 2018-10-31 NOTE — ASSESSMENT & PLAN NOTE
"-vague and appears unwilling/uncomfortable with providing information  -verbalizing portions of his history, social situation, etc to different providers  -unwilling to be interviewed in traditional sense, prefers to "tell his story"  -continue to obtain past medical, social, and home medications as patient will allow    "

## 2018-10-31 NOTE — ASSESSMENT & PLAN NOTE
-unknown history of control or medications  -reports non compliance with home clonidine  -discontinue clonidine  -initiated carvedilol for BP management in setting of potential cocaine abuse  -encouraged low sodium diet and increasing physical activity   -monitor

## 2018-10-31 NOTE — CONSULTS
Consult received for cardiac education. Pt now on regular diet. Lipid panel WNL so diet education not indicated at this time. Please re-consult as needed.

## 2018-10-31 NOTE — PLAN OF CARE
10/31/18 1231   Final Note   Assessment Type Final Discharge Note   Anticipated Discharge Disposition Left Against   Hospital Follow Up  Appt(s) scheduled? No

## 2018-10-31 NOTE — PLAN OF CARE
10/31/18 0932   Discharge Assessment   Assessment Type Discharge Planning Assessment   Assessment information obtained from? Patient;Medical Record   Expected Length of Stay (days) 2   Prior to hospitilization cognitive status: Alert/Oriented   Prior to hospitalization functional status: Independent   Current cognitive status: Alert/Oriented   Current Functional Status: Independent   Lives With alone   Able to Return to Prior Arrangements yes   Is patient able to care for self after discharge? Yes   Patient's perception of discharge disposition home or selfcare   Readmission Within The Last 30 Days no previous admission in last 30 days   Patient currently being followed by outpatient case management? No   Patient currently receives any other outside agency services? No   Equipment Currently Used at Home none   Do you have any problems affording any of your prescribed medications? TBD   Does the patient have transportation home? No   Does the patient receive services at the [facility]? No   Discharge Plan A Home   Pt admitted with NSTEMI. Pt states he just moved here from [place]. States he lives at [address]  in [place] behind the [employer] I see no such street listed on maps. Pt states he is independent in his ADLs. Plan is to DC with no needs. States he has no transportation. CM did offer transportation to his residence. No other needs anticipated.

## 2018-10-31 NOTE — NURSING
Entered room to give morning meds.  Pt nowhere to be found.  IV and telemetry box found at bedside.  No belongings in room.  PAM SPEAR paged to notify. Jade unit director notified.  Mark Anthony reeves supervisor notified.